# Patient Record
Sex: MALE | Race: WHITE | NOT HISPANIC OR LATINO | Employment: FULL TIME | URBAN - METROPOLITAN AREA
[De-identification: names, ages, dates, MRNs, and addresses within clinical notes are randomized per-mention and may not be internally consistent; named-entity substitution may affect disease eponyms.]

---

## 2017-02-21 ENCOUNTER — HOSPITAL ENCOUNTER (OUTPATIENT)
Dept: GENERAL RADIOLOGY | Facility: HOSPITAL | Age: 32
Discharge: HOME | End: 2017-02-21
Admitting: NURSE PRACTITIONER

## 2017-02-21 ENCOUNTER — OFFICE VISIT (OUTPATIENT)
Dept: FAMILY MEDICINE CLINIC | Facility: CLINIC | Age: 32
End: 2017-02-21

## 2017-02-21 VITALS
WEIGHT: 192 LBS | OXYGEN SATURATION: 100 % | HEIGHT: 75 IN | HEART RATE: 80 BPM | BODY MASS INDEX: 23.87 KG/M2 | TEMPERATURE: 98.4 F | DIASTOLIC BLOOD PRESSURE: 70 MMHG | SYSTOLIC BLOOD PRESSURE: 128 MMHG

## 2017-02-21 DIAGNOSIS — S39.012A LOW BACK STRAIN, INITIAL ENCOUNTER: ICD-10-CM

## 2017-02-21 DIAGNOSIS — J06.9 ACUTE URI: Primary | ICD-10-CM

## 2017-02-21 PROCEDURE — 99214 OFFICE O/P EST MOD 30 MIN: CPT | Performed by: NURSE PRACTITIONER

## 2017-02-21 PROCEDURE — 72100 X-RAY EXAM L-S SPINE 2/3 VWS: CPT

## 2017-02-21 RX ORDER — NAPROXEN 500 MG/1
500 TABLET ORAL 2 TIMES DAILY WITH MEALS
Qty: 60 TABLET | Refills: 1 | Status: SHIPPED | OUTPATIENT
Start: 2017-02-21 | End: 2017-08-31 | Stop reason: HOSPADM

## 2017-02-21 NOTE — PATIENT INSTRUCTIONS
Muscle Strain  A muscle strain is an injury that occurs when a muscle is stretched beyond its normal length. Usually a small number of muscle fibers are torn when this happens. Muscle strain is rated in degrees. First-degree strains have the least amount of muscle fiber tearing and pain. Second-degree and third-degree strains have increasingly more tearing and pain.   Usually, recovery from muscle strain takes 1-2 weeks. Complete healing takes 5-6 weeks.   CAUSES   Muscle strain happens when a sudden, violent force placed on a muscle stretches it too far. This may occur with lifting, sports, or a fall.   RISK FACTORS  Muscle strain is especially common in athletes.   SIGNS AND SYMPTOMS  At the site of the muscle strain, there may be:  · Pain.  · Bruising.  · Swelling.  · Difficulty using the muscle due to pain or lack of normal function.  DIAGNOSIS   Your health care provider will perform a physical exam and ask about your medical history.  TREATMENT   Often, the best treatment for a muscle strain is resting, icing, and applying cold compresses to the injured area.    HOME CARE INSTRUCTIONS   · Use the PRICE method of treatment to promote muscle healing during the first 2-3 days after your injury. The PRICE method involves:    Protecting the muscle from being injured again.    Restricting your activity and resting the injured body part.    Icing your injury. To do this, put ice in a plastic bag. Place a towel between your skin and the bag. Then, apply the ice and leave it on from 15-20 minutes each hour. After the third day, switch to moist heat packs.    Apply compression to the injured area with a splint or elastic bandage. Be careful not to wrap it too tightly. This may interfere with blood circulation or increase swelling.    Elevate the injured body part above the level of your heart as often as you can.  · Only take over-the-counter or prescription medicines for pain, discomfort, or fever as directed by your  health care provider.  · Warming up prior to exercise helps to prevent future muscle strains.  SEEK MEDICAL CARE IF:   · You have increasing pain or swelling in the injured area.  · You have numbness, tingling, or a significant loss of strength in the injured area.  MAKE SURE YOU:   · Understand these instructions.  · Will watch your condition.  · Will get help right away if you are not doing well or get worse.     This information is not intended to replace advice given to you by your health care provider. Make sure you discuss any questions you have with your health care provider.     Document Released: 12/18/2006 Document Revised: 10/08/2014 Document Reviewed: 07/17/2014  [employer] Interactive Patient Education ©2016 [employer]    Low Back Strain With Rehab  A strain is an injury in which a tendon or muscle is torn. The muscles and tendons of the lower back are vulnerable to strains. However, these muscles and tendons are very strong and require a great force to be injured. Strains are classified into three categories. Grade 1 strains cause pain, but the tendon is not lengthened. Grade 2 strains include a lengthened ligament, due to the ligament being stretched or partially ruptured. With grade 2 strains there is still function, although the function may be decreased. Grade 3 strains involve a complete tear of the tendon or muscle, and function is usually impaired.  SYMPTOMS   · Pain in the lower back.  · Pain that affects one side more than the other.  · Pain that gets worse with movement and may be felt in the hip, buttocks, or back of the thigh.  · Muscle spasms of the muscles in the back.  · Swelling along the muscles of the back.  · Loss of strength of the back muscles.  · Crackling sound (crepitation) when the muscles are touched.  CAUSES   Lower back strains occur when a force is placed on the muscles or tendons that is greater than they can handle. Common causes of injury include:  · Prolonged overuse  of the muscle-tendon units in the lower back, usually from incorrect posture.  · A single violent injury or force applied to the back.  RISK INCREASES WITH:  · Sports that involve twisting forces on the spine or a lot of bending at the waist (football, rugby, weightlifting, bowling, golf, tennis, speed skating, racquetball, swimming, running, gymnastics, diving).  · Poor strength and flexibility.  · Failure to warm up properly before activity.  · Family history of lower back pain or disk disorders.  · Previous back injury or surgery (especially fusion).  · Poor posture with lifting, especially heavy objects.  · Prolonged sitting, especially with poor posture.  PREVENTION   · Learn and use proper posture when sitting or lifting (maintain proper posture when sitting, lift using the knees and legs, not at the waist).  · Warm up and stretch properly before activity.  · Allow for adequate recovery between workouts.  · Maintain physical fitness:    Strength, flexibility, and endurance.    Cardiovascular fitness.  PROGNOSIS   If treated properly, lower back strains usually heal within 6 weeks.  RELATED COMPLICATIONS   · Recurring symptoms, resulting in a chronic problem.  · Chronic inflammation, scarring, and partial muscle-tendon tear.  · Delayed healing or resolution of symptoms.  · Prolonged disability.  TREATMENT   Treatment first involves the use of ice and medicine, to reduce pain and inflammation. The use of strengthening and stretching exercises may help reduce pain with activity. These exercises may be performed at home or with a therapist. Severe injuries may require referral to a therapist for further evaluation and treatment, such as ultrasound. Your caregiver may advise that you wear a back brace or corset, to help reduce pain and discomfort. Often, prolonged bed rest results in greater harm then benefit. Corticosteroid injections may be recommended. However, these should be reserved for the most serious  cases. It is important to avoid using your back when lifting objects. At night, sleep on your back on a firm mattress with a pillow placed under your knees. If non-surgical treatment is unsuccessful, surgery may be needed.   MEDICATION   · If pain medicine is needed, nonsteroidal anti-inflammatory medicines (aspirin and ibuprofen), or other minor pain relievers (acetaminophen), are often advised.  · Do not take pain medicine for 7 days before surgery.  · Prescription pain relievers may be given, if your caregiver thinks they are needed. Use only as directed and only as much as you need.  · Ointments applied to the skin may be helpful.  · Corticosteroid injections may be given by your caregiver. These injections should be reserved for the most serious cases, because they may only be given a certain number of times.  HEAT AND COLD  · Cold treatment (icing) should be applied for 10 to 15 minutes every 2 to 3 hours for inflammation and pain, and immediately after activity that aggravates your symptoms. Use ice packs or an ice massage.  · Heat treatment may be used before performing stretching and strengthening activities prescribed by your caregiver, physical therapist, or . Use a heat pack or a warm water soak.  SEEK MEDICAL CARE IF:   · Symptoms get worse or do not improve in 2 to 4 weeks, despite treatment.  · You develop numbness, weakness, or loss of bowel or bladder function.  · New, unexplained symptoms develop. (Drugs used in treatment may produce side effects.)  EXERCISES   RANGE OF MOTION (ROM) AND STRETCHING EXERCISES - Low Back Strain  Most people with lower back pain will find that their symptoms get worse with excessive bending forward (flexion) or arching at the lower back (extension). The exercises which will help resolve your symptoms will focus on the opposite motion.   Your physician, physical therapist or  will help you determine which exercises will be most helpful to  resolve your lower back pain. Do not complete any exercises without first consulting with your caregiver. Discontinue any exercises which make your symptoms worse until you speak to your caregiver.   If you have pain, numbness or tingling which travels down into your buttocks, leg or foot, the goal of the therapy is for these symptoms to move closer to your back and eventually resolve. Sometimes, these leg symptoms will get better, but your lower back pain may worsen. This is typically an indication of progress in your rehabilitation. Be very alert to any changes in your symptoms and the activities in which you participated in the 24 hours prior to the change. Sharing this information with your caregiver will allow him/her to most efficiently treat your condition.  · These exercises may help you when beginning to rehabilitate your injury. Your symptoms may resolve with or without further involvement from your physician, physical therapist or . While completing these exercises, remember:  · Restoring tissue flexibility helps normal motion to return to the joints. This allows healthier, less painful movement and activity.  · An effective stretch should be held for at least 30 seconds.  · A stretch should never be painful. You should only feel a gentle lengthening or release in the stretched tissue.  FLEXION RANGE OF MOTION AND STRETCHING EXERCISES:  STRETCH - Flexion, Single Knee to Chest   · Lie on a firm bed or floor with both legs extended in front of you.  · Keeping one leg in contact with the floor, bring your opposite knee to your chest. Hold your leg in place by either grabbing behind your thigh or at your knee.  · Pull until you feel a gentle stretch in your lower back. Hold __________ seconds.  · Slowly release your grasp and repeat the exercise with the opposite side.  Repeat __________ times. Complete this exercise __________ times per day.   STRETCH - Flexion, Double Knee to Chest   · Lie  on a firm bed or floor with both legs extended in front of you.  · Keeping one leg in contact with the floor, bring your opposite knee to your chest.  · Tense your stomach muscles to support your back and then lift your other knee to your chest. Hold your legs in place by either grabbing behind your thighs or at your knees.  · Pull both knees toward your chest until you feel a gentle stretch in your lower back. Hold __________ seconds.  · Tense your stomach muscles and slowly return one leg at a time to the floor.  Repeat __________ times. Complete this exercise __________ times per day.   STRETCH - Low Trunk Rotation  · Lie on a firm bed or floor. Keeping your legs in front of you, bend your knees so they are both pointed toward the ceiling and your feet are flat on the floor.  · Extend your arms out to the side. This will stabilize your upper body by keeping your shoulders in contact with the floor.  · Gently and slowly drop both knees together to one side until you feel a gentle stretch in your lower back. Hold for __________ seconds.  · Tense your stomach muscles to support your lower back as you bring your knees back to the starting position. Repeat the exercise to the other side.  Repeat __________ times. Complete this exercise __________ times per day   EXTENSION RANGE OF MOTION AND FLEXIBILITY EXERCISES:  STRETCH - Extension, Prone on Elbows   · Lie on your stomach on the floor, a bed will be too soft. Place your palms about shoulder width apart and at the height of your head.  · Place your elbows under your shoulders. If this is too painful, stack pillows under your chest.  · Allow your body to relax so that your hips drop lower and make contact more completely with the floor.  · Hold this position for __________ seconds.  · Slowly return to lying flat on the floor.  Repeat __________ times. Complete this exercise __________ times per day.   RANGE OF MOTION - Extension, Prone Press Ups  · Lie on your  "stomach on the floor, a bed will be too soft. Place your palms about shoulder width apart and at the height of your head.  · Keeping your back as relaxed as possible, slowly straighten your elbows while keeping your hips on the floor. You may adjust the placement of your hands to maximize your comfort. As you gain motion, your hands will come more underneath your shoulders.  · Hold this position __________ seconds.  · Slowly return to lying flat on the floor.  Repeat __________ times. Complete this exercise __________ times per day.   RANGE OF MOTION- Quadruped, Neutral Spine   · Assume a hands and knees position on a firm surface. Keep your hands under your shoulders and your knees under your hips. You may place padding under your knees for comfort.  · Drop your head and point your tail bone toward the ground below you. This will round out your lower back like an angry cat. Hold this position for __________ seconds.  · Slowly lift your head and release your tail bone so that your back sags into a large arch, like an old horse.  · Hold this position for __________ seconds.  · Repeat this until you feel limber in your lower back.  · Now, find your \"sweet spot.\" This will be the most comfortable position somewhere between the two previous positions. This is your neutral spine. Once you have found this position, tense your stomach muscles to support your lower back.  · Hold this position for __________ seconds.  Repeat __________ times. Complete this exercise __________ times per day.   STRENGTHENING EXERCISES - Low Back Strain  These exercises may help you when beginning to rehabilitate your injury. These exercises should be done near your \"sweet spot.\" This is the neutral, low-back arch, somewhere between fully rounded and fully arched, that is your least painful position. When performed in this safe range of motion, these exercises can be used for people who have either a flexion or extension based injury. These " exercises may resolve your symptoms with or without further involvement from your physician, physical therapist or . While completing these exercises, remember:   · Muscles can gain both the endurance and the strength needed for everyday activities through controlled exercises.  · Complete these exercises as instructed by your physician, physical therapist or . Increase the resistance and repetitions only as guided.  · You may experience muscle soreness or fatigue, but the pain or discomfort you are trying to eliminate should never worsen during these exercises. If this pain does worsen, stop and make certain you are following the directions exactly. If the pain is still present after adjustments, discontinue the exercise until you can discuss the trouble with your caregiver.  STRENGTHENING - Deep Abdominals, Pelvic Tilt  · Lie on a firm bed or floor. Keeping your legs in front of you, bend your knees so they are both pointed toward the ceiling and your feet are flat on the floor.  · Tense your lower abdominal muscles to press your lower back into the floor. This motion will rotate your pelvis so that your tail bone is scooping upwards rather than pointing at your feet or into the floor.  · With a gentle tension and even breathing, hold this position for __________ seconds.  Repeat __________ times. Complete this exercise __________ times per day.   STRENGTHENING - Abdominals, Crunches   · Lie on a firm bed or floor. Keeping your legs in front of you, bend your knees so they are both pointed toward the ceiling and your feet are flat on the floor. Cross your arms over your chest.  · Slightly tip your chin down without bending your neck.  · Tense your abdominals and slowly lift your trunk high enough to just clear your shoulder blades. Lifting higher can put excessive stress on the lower back and does not further strengthen your abdominal muscles.  · Control your return to the starting  position.  Repeat __________ times. Complete this exercise __________ times per day.   STRENGTHENING - Quadruped, Opposite UE/LE Lift   · Assume a hands and knees position on a firm surface. Keep your hands under your shoulders and your knees under your hips. You may place padding under your knees for comfort.  · Find your neutral spine and gently tense your abdominal muscles so that you can maintain this position. Your shoulders and hips should form a rectangle that is parallel with the floor and is not twisted.  · Keeping your trunk steady, lift your right hand no higher than your shoulder and then your left leg no higher than your hip. Make sure you are not holding your breath. Hold this position __________ seconds.  · Continuing to keep your abdominal muscles tense and your back steady, slowly return to your starting position. Repeat with the opposite arm and leg.  Repeat __________ times. Complete this exercise __________ times per day.   STRENGTHENING - Lower Abdominals, Double Knee Lift  · Lie on a firm bed or floor. Keeping your legs in front of you, bend your knees so they are both pointed toward the ceiling and your feet are flat on the floor.  · Tense your abdominal muscles to brace your lower back and slowly lift both of your knees until they come over your hips. Be certain not to hold your breath.  · Hold __________ seconds. Using your abdominal muscles, return to the starting position in a slow and controlled manner.  Repeat __________ times. Complete this exercise __________ times per day.   POSTURE AND BODY MECHANICS CONSIDERATIONS - Low Back Strain  Keeping correct posture when sitting, standing or completing your activities will reduce the stress put on different body tissues, allowing injured tissues a chance to heal and limiting painful experiences. The following are general guidelines for improved posture. Your physician or physical therapist will provide you with any instructions specific to  your needs. While reading these guidelines, remember:  · The exercises prescribed by your provider will help you have the flexibility and strength to maintain correct postures.  · The correct posture provides the best environment for your joints to work. All of your joints have less wear and tear when properly supported by a spine with good posture. This means you will experience a healthier, less painful body.  · Correct posture must be practiced with all of your activities, especially prolonged sitting and standing. Correct posture is as important when doing repetitive low-stress activities (typing) as it is when doing a single heavy-load activity (lifting).  RESTING POSITIONS  Consider which positions are most painful for you when choosing a resting position. If you have pain with flexion-based activities (sitting, bending, stooping, squatting), choose a position that allows you to rest in a less flexed posture. You would want to avoid curling into a fetal position on your side. If your pain worsens with extension-based activities (prolonged standing, working overhead), avoid resting in an extended position such as sleeping on your stomach. Most people will find more comfort when they rest with their spine in a more neutral position, neither too rounded nor too arched. Lying on a non-sagging bed on your side with a pillow between your knees, or on your back with a pillow under your knees will often provide some relief. Keep in mind, being in any one position for a prolonged period of time, no matter how correct your posture, can still lead to stiffness.  PROPER SITTING POSTURE  In order to minimize stress and discomfort on your spine, you must sit with correct posture. Sitting with good posture should be effortless for a healthy body. Returning to good posture is a gradual process. Many people can work toward this most comfortably by using various supports until they have the flexibility and strength to maintain  this posture on their own.  When sitting with proper posture, your ears will fall over your shoulders and your shoulders will fall over your hips. You should use the back of the chair to support your upper back. Your lower back will be in a neutral position, just slightly arched. You may place a small pillow or folded towel at the base of your lower back for support.   When working at a desk, create an environment that supports good, upright posture. Without extra support, muscles tire, which leads to excessive strain on joints and other tissues. Keep these recommendations in mind:  CHAIR:  · A chair should be able to slide under your desk when your back makes contact with the back of the chair. This allows you to work closely.  · The chair's height should allow your eyes to be level with the upper part of your monitor and your hands to be slightly lower than your elbows.  BODY POSITION  · Your feet should make contact with the floor. If this is not possible, use a foot rest.  · Keep your ears over your shoulders. This will reduce stress on your neck and lower back.  INCORRECT SITTING POSTURES   If you are feeling tired and unable to assume a healthy sitting posture, do not slouch or slump. This puts excessive strain on your back tissues, causing more damage and pain. Healthier options include:  · Using more support, like a lumbar pillow.  · Switching tasks to something that requires you to be upright or walking.  · Talking a brief walk.  · Lying down to rest in a neutral-spine position.  PROLONGED STANDING WHILE SLIGHTLY LEANING FORWARD   When completing a task that requires you to lean forward while standing in one place for a long time, place either foot up on a stationary 2-4 inch high object to help maintain the best posture. When both feet are on the ground, the lower back tends to lose its slight inward curve. If this curve flattens (or becomes too large), then the back and your other joints will experience  too much stress, tire more quickly, and can cause pain.  CORRECT STANDING POSTURES  Proper standing posture should be assumed with all daily activities, even if they only take a few moments, like when brushing your teeth. As in sitting, your ears should fall over your shoulders and your shoulders should fall over your hips. You should keep a slight tension in your abdominal muscles to brace your spine. Your tailbone should point down to the ground, not behind your body, resulting in an over-extended swayback posture.   INCORRECT STANDING POSTURES   Common incorrect standing postures include a forward head, locked knees and/or an excessive swayback.  WALKING  Walk with an upright posture. Your ears, shoulders and hips should all line-up.  PROLONGED ACTIVITY IN A FLEXED POSITION  When completing a task that requires you to bend forward at your waist or lean over a low surface, try to find a way to stabilize 3 out of 4 of your limbs. You can place a hand or elbow on your thigh or rest a knee on the surface you are reaching across. This will provide you more stability so that your muscles do not fatigue as quickly. By keeping your knees relaxed, or slightly bent, you will also reduce stress across your lower back.  CORRECT LIFTING TECHNIQUES  DO :   · Assume a wide stance. This will provide you more stability and the opportunity to get as close as possible to the object which you are lifting.  · Tense your abdominals to brace your spine. Bend at the knees and hips. Keeping your back locked in a neutral-spine position, lift using your leg muscles. Lift with your legs, keeping your back straight.  · Test the weight of unknown objects before attempting to lift them.  · Try to keep your elbows locked down at your sides in order get the best strength from your shoulders when carrying an object.  · Always ask for help when lifting heavy or awkward objects.  INCORRECT LIFTING TECHNIQUES  DO NOT:   · Lock your knees when  lifting, even if it is a small object.  · Bend and twist. Pivot at your feet or move your feet when needing to change directions.  · Assume that you can safely  even a paper clip without proper posture.     This information is not intended to replace advice given to you by your health care provider. Make sure you discuss any questions you have with your health care provider.     Document Released: 12/18/2006 Document Revised: 01/08/2016 Document Reviewed: 09/28/2016  [employer] Interactive Patient Education ©2016 [employer]      Discharge instructions    Start naproxen twice daily for anti-inflammatory effects  Start exercise program 5 days a week stretching back  Good ergonomics at work  Consider standing desk standing half the day rotating    If weakness incontinence groin paresthesias emergency room    Recheck 6 weeks     Thank You,      James Epley, NP

## 2017-02-22 NOTE — PROGRESS NOTES
Subjective   Paul West is a 31 y.o. male.     HPI Comments: Cold symptoms sinus drainage scratchy throat mildly sore tender nodes no cough no fever no chills no body aches  Symptoms 4 days no recent travel    Back strain approximately 6 weeks ago his girlfriend jumped in his arms  Call her felt instant pain  Continues to have low back pain no worsening  Mid low back      No prior chronic back problems        Back Pain   This is a new problem. The current episode started more than 1 month ago. The problem occurs daily. The problem is unchanged. The pain is present in the lumbar spine. The quality of the pain is described as aching. The pain does not radiate. The pain is mild. The pain is worse during the day. The symptoms are aggravated by lying down and bending. Pertinent negatives include no bladder incontinence, bowel incontinence, leg pain, numbness, paresis, paresthesias, tingling, weakness or weight loss. He has tried nothing for the symptoms.        The following portions of the patient's history were reviewed and updated as appropriate: allergies, current medications, past medical history, past social history, past surgical history and problem list.    Review of Systems   Constitutional: Negative for weight loss.   HENT: Positive for rhinorrhea, sneezing and sore throat.    Respiratory: Negative for shortness of breath.    Gastrointestinal: Negative for bowel incontinence.   Genitourinary: Negative for bladder incontinence.   Musculoskeletal: Positive for back pain.   Neurological: Negative for tingling, weakness, numbness and paresthesias.   All other systems reviewed and are negative.      Objective   Physical Exam   Constitutional: He is oriented to person, place, and time. He appears well-developed and well-nourished.   HENT:   Head: Normocephalic.   Mouth/Throat: Oropharynx is clear and moist.   Tm clear kobe no mass canal patent without d/c turbinates congested   Eyes: Conjunctivae are normal.  Pupils are equal, round, and reactive to light. No scleral icterus.   Neck: Neck supple. No JVD present. No thyromegaly present.   Cardiovascular: Normal rate, regular rhythm and normal heart sounds.  Exam reveals no gallop and no friction rub.    No murmur heard.  Pulmonary/Chest: Effort normal and breath sounds normal. No stridor. No respiratory distress. He has no wheezes. He has no rales.   Abdominal: Soft. Bowel sounds are normal. He exhibits no distension. There is no tenderness.   No hepatosplenomegaly, no ascites,   Musculoskeletal: He exhibits no edema or tenderness.   Negative straight leg raise normal flexion plantar flexion dorsal flexion normal   Lymphadenopathy:     He has no cervical adenopathy.   Neurological: He is alert and oriented to person, place, and time. He has normal reflexes.   Skin: Skin is warm and dry. No rash noted. No erythema.   Psychiatric: He has a normal mood and affect. His behavior is normal. Judgment and thought content normal.   Vitals reviewed.      Assessment/Plan   Paul was seen today for back pain and uri.    Diagnoses and all orders for this visit:    Acute URI    Low back strain, initial encounter  -     XR Spine Lumbar 2 or 3 View  -     naproxen (NAPROSYN) 500 MG tablet; Take 1 tablet by mouth 2 (Two) Times a Day With Meals. For low back pain, strain                  Discharge instructions    Start naproxen twice daily for anti-inflammatory effects  Start exercise program 5 days a week stretching back  Good ergonomics at work  Consider standing desk standing half the day rotating    If weakness incontinence groin paresthesias emergency room    Recheck 6 weeks     Thank You,      James Epley, NP

## 2017-02-28 ENCOUNTER — TELEPHONE (OUTPATIENT)
Dept: FAMILY MEDICINE CLINIC | Facility: CLINIC | Age: 32
End: 2017-02-28

## 2017-06-01 ENCOUNTER — OFFICE VISIT (OUTPATIENT)
Dept: FAMILY MEDICINE CLINIC | Facility: CLINIC | Age: 32
End: 2017-06-01

## 2017-06-01 VITALS
BODY MASS INDEX: 23.62 KG/M2 | HEART RATE: 75 BPM | OXYGEN SATURATION: 98 % | TEMPERATURE: 98.1 F | SYSTOLIC BLOOD PRESSURE: 114 MMHG | WEIGHT: 190 LBS | DIASTOLIC BLOOD PRESSURE: 74 MMHG | HEIGHT: 75 IN

## 2017-06-01 DIAGNOSIS — R10.9 ABDOMINAL CRAMPING: ICD-10-CM

## 2017-06-01 DIAGNOSIS — R10.9 ABDOMINAL PAIN, UNSPECIFIED LOCATION: ICD-10-CM

## 2017-06-01 DIAGNOSIS — M54.50 ACUTE MIDLINE LOW BACK PAIN WITHOUT SCIATICA: ICD-10-CM

## 2017-06-01 DIAGNOSIS — S39.92XD LOWER BACK INJURY, SUBSEQUENT ENCOUNTER: Primary | ICD-10-CM

## 2017-06-01 LAB
ALBUMIN SERPL-MCNC: 4.8 G/DL (ref 3.5–5.2)
ALBUMIN/GLOB SERPL: 1.5 G/DL
ALP SERPL-CCNC: 60 U/L (ref 39–117)
ALT SERPL-CCNC: 30 U/L (ref 1–41)
AST SERPL-CCNC: 26 U/L (ref 1–40)
BASOPHILS # BLD AUTO: 0.04 10*3/MM3 (ref 0–0.2)
BASOPHILS NFR BLD AUTO: 0.8 % (ref 0–1.5)
BILIRUB SERPL-MCNC: 0.8 MG/DL (ref 0.1–1.2)
BUN SERPL-MCNC: 14 MG/DL (ref 6–20)
BUN/CREAT SERPL: 14.1 (ref 7–25)
CALCIUM SERPL-MCNC: 10.5 MG/DL (ref 8.6–10.5)
CHLORIDE SERPL-SCNC: 99 MMOL/L (ref 98–107)
CO2 SERPL-SCNC: 26.4 MMOL/L (ref 22–29)
CREAT SERPL-MCNC: 0.99 MG/DL (ref 0.76–1.27)
EOSINOPHIL # BLD AUTO: 0.13 10*3/MM3 (ref 0–0.7)
EOSINOPHIL NFR BLD AUTO: 2.7 % (ref 0.3–6.2)
ERYTHROCYTE [DISTWIDTH] IN BLOOD BY AUTOMATED COUNT: 13.7 % (ref 11.5–14.5)
GLOBULIN SER CALC-MCNC: 3.1 GM/DL
GLUCOSE SERPL-MCNC: 97 MG/DL (ref 65–99)
HCT VFR BLD AUTO: 46 % (ref 40.4–52.2)
HGB BLD-MCNC: 15.1 G/DL (ref 13.7–17.6)
IMM GRANULOCYTES # BLD: 0 10*3/MM3 (ref 0–0.03)
IMM GRANULOCYTES NFR BLD: 0 % (ref 0–0.5)
LIPASE SERPL-CCNC: 29 U/L (ref 13–60)
LYMPHOCYTES # BLD AUTO: 1.61 10*3/MM3 (ref 0.9–4.8)
LYMPHOCYTES NFR BLD AUTO: 34 % (ref 19.6–45.3)
MCH RBC QN AUTO: 27.8 PG (ref 27–32.7)
MCHC RBC AUTO-ENTMCNC: 32.8 G/DL (ref 32.6–36.4)
MCV RBC AUTO: 84.6 FL (ref 79.8–96.2)
MONOCYTES # BLD AUTO: 0.62 10*3/MM3 (ref 0.2–1.2)
MONOCYTES NFR BLD AUTO: 13.1 % (ref 5–12)
NEUTROPHILS # BLD AUTO: 2.34 10*3/MM3 (ref 1.9–8.1)
NEUTROPHILS NFR BLD AUTO: 49.4 % (ref 42.7–76)
PLATELET # BLD AUTO: 196 10*3/MM3 (ref 140–500)
POTASSIUM SERPL-SCNC: 4.5 MMOL/L (ref 3.5–5.2)
PROT SERPL-MCNC: 7.9 G/DL (ref 6–8.5)
RBC # BLD AUTO: 5.44 10*6/MM3 (ref 4.6–6)
SODIUM SERPL-SCNC: 140 MMOL/L (ref 136–145)
TSH SERPL DL<=0.005 MIU/L-ACNC: 1.73 MIU/ML (ref 0.27–4.2)
WBC # BLD AUTO: 4.74 10*3/MM3 (ref 4.5–10.7)

## 2017-06-01 PROCEDURE — 99214 OFFICE O/P EST MOD 30 MIN: CPT | Performed by: NURSE PRACTITIONER

## 2017-06-01 RX ORDER — OMEPRAZOLE 40 MG/1
40 CAPSULE, DELAYED RELEASE ORAL DAILY
Qty: 30 CAPSULE | Refills: 1 | Status: SHIPPED | OUTPATIENT
Start: 2017-06-01 | End: 2017-09-07 | Stop reason: SDUPTHER

## 2017-06-01 NOTE — PATIENT INSTRUCTIONS
Discharge instructions  Avoid anti-inflammatory's now  If pain medicine required take Tylenol extra strength 8 hour extended release 650 mg 1-2 twice daily  Avoid alcohol with this    Physical therapy  MRI  Follow-up in 6 weeks or after physical therapy    Dietary changes avoid spacey greasy foods gassy     If severe pain vomiting fever  Or persistent pain emergency room    Thank You,      James Epley,  NP

## 2017-06-01 NOTE — PROGRESS NOTES
Patient's here follow-up low back pain  Injury early January after girlfriend jumped in his arms felt immediate sharp low back pain    Continues to have pain no radiation x-rays normal  Anti-inflammatory several weeks without help  Better if he lays down flex his knees  Aggravated by poor posture  Sitting    No weakness no bowel bladder change related to back       Several weeks of an anti-inflammatory at least 3  As well as regular home exercises which I instructed him last visit  Modest improvement  Still continues to have aggravating pain  Pain level 3-5  Rarely 0 usually as above      Mid and upper abdominal cramping after eating volume,      Plan  Physical therapy  Resume anti-inflammatory's temporarily  MRI rule out disc herniation lumbar spine    Follow-up after therapy  If weakness bowel incontinence fever chills emergency room

## 2017-06-08 ENCOUNTER — TELEPHONE (OUTPATIENT)
Dept: FAMILY MEDICINE CLINIC | Facility: CLINIC | Age: 32
End: 2017-06-08

## 2017-06-08 DIAGNOSIS — M54.50 ACUTE MIDLINE LOW BACK PAIN WITHOUT SCIATICA: Primary | ICD-10-CM

## 2017-06-08 DIAGNOSIS — M51.36 BULGING LUMBAR DISC: ICD-10-CM

## 2017-06-09 ENCOUNTER — TELEPHONE (OUTPATIENT)
Dept: FAMILY MEDICINE CLINIC | Facility: CLINIC | Age: 32
End: 2017-06-09

## 2017-06-09 NOTE — TELEPHONE ENCOUNTER
patient called this morning to discuss MRI results. patient will be available  today. if for some reason he does not answer you may leave results on his voicemail.

## 2017-06-11 PROBLEM — R10.9 ABDOMINAL PAIN: Status: ACTIVE | Noted: 2017-06-11

## 2017-06-11 PROBLEM — S39.92XA LOWER BACK INJURY: Status: ACTIVE | Noted: 2017-06-11

## 2017-06-11 PROBLEM — R10.9 ABDOMINAL CRAMPING: Status: ACTIVE | Noted: 2017-06-11

## 2017-06-11 PROBLEM — M54.50 ACUTE MIDLINE LOW BACK PAIN WITHOUT SCIATICA: Status: ACTIVE | Noted: 2017-06-11

## 2017-06-11 NOTE — PROGRESS NOTES
Subjective   Paul West is a 31 y.o. male.     HPI Comments:    started after picking up girlfriend several months ago     wn flex his knees  Aggravated by poor posture  Sitting    No weakness no bowel bladder change related to back       Several weeks of an anti-inflammatory at least 3  As well as regular home exercises which I instructed him last visit  Modest improvement  Still continues to have aggravating pain  Pain level 3-5  Rarely 0 usually as above      Mid and upper abdominal cramping after eating   Several months  Not related to back pain no radiation no nausea vomiting fever chills no coffee-ground emesis              Back Pain   This is a new problem. The current episode started more than 1 month ago. The problem occurs daily. The problem is unchanged. The pain is present in the lumbar spine. The pain is moderate. The symptoms are aggravated by bending and position. Associated symptoms include numbness. Pertinent negatives include no fever. The treatment provided mild relief.        The following portions of the patient's history were reviewed and updated as appropriate: allergies, past family history, past medical history, past social history, past surgical history and problem list.    Review of Systems   Constitutional: Positive for activity change. Negative for appetite change, fatigue, fever and unexpected weight change.   HENT: Negative.    Eyes: Negative.    Respiratory: Negative.    Gastrointestinal: Negative for abdominal distention, anal bleeding, blood in stool, diarrhea and vomiting.        Mild cramping after meals   Genitourinary: Negative.    Musculoskeletal: Positive for back pain.   Skin: Negative.    Neurological: Positive for numbness.   Psychiatric/Behavioral: Negative.        Objective   Physical Exam   Constitutional: He is oriented to person, place, and time. He appears well-developed and well-nourished.   HENT:   Head: Normocephalic.   Nose: Nose normal.   Mouth/Throat:  Oropharynx is clear and moist.   Tm clear kobe no mass canal patent without d/c   Eyes: Conjunctivae are normal. Pupils are equal, round, and reactive to light. No scleral icterus.   Neck: Neck supple. No JVD present. No thyromegaly present.   Cardiovascular: Normal rate, regular rhythm and normal heart sounds.  Exam reveals no gallop and no friction rub.    No murmur heard.  Pulmonary/Chest: Effort normal and breath sounds normal. No stridor. No respiratory distress. He has no wheezes. He has no rales.   Abdominal: Soft. Bowel sounds are normal. He exhibits no distension. There is no tenderness.   No hepatosplenomegaly, no ascites,   Musculoskeletal: He exhibits no edema or tenderness.   Negative straight leg raise plantar flexion dorsal flexion normal gait normal  Low back spine nontender   Lymphadenopathy:     He has no cervical adenopathy.   Neurological: He is alert and oriented to person, place, and time. He has normal reflexes.   Skin: Skin is warm and dry. No rash noted. No erythema.   Psychiatric: He has a normal mood and affect. His behavior is normal. Judgment and thought content normal.   Vitals reviewed.      Assessment/Plan   Paul was seen today for abdominal pain and back pain.    Diagnoses and all orders for this visit:    Lower back injury, subsequent encounter  -     MRI Lumbar Spine Without Contrast  -     Ambulatory Referral to Physical Therapy Evaluate and treat  -     CBC & Differential  -     Comprehensive Metabolic Panel  -     TSH Rfx On Abnormal To Free T4  -     Lipase  -     H. Pylori Antigen, Stool    Acute midline low back pain without sciatica  -     MRI Lumbar Spine Without Contrast  -     Ambulatory Referral to Physical Therapy Evaluate and treat  -     CBC & Differential  -     Comprehensive Metabolic Panel  -     TSH Rfx On Abnormal To Free T4  -     Lipase  -     H. Pylori Antigen, Stool    Abdominal pain, unspecified location  -     CBC & Differential  -     Comprehensive  Metabolic Panel  -     TSH Rfx On Abnormal To Free T4  -     Lipase  -     H. Pylori Antigen, Stool    Abdominal cramping  -     CBC & Differential  -     Comprehensive Metabolic Panel  -     TSH Rfx On Abnormal To Free T4  -     Lipase  -     H. Pylori Antigen, Stool    Other orders  -     omeprazole (priLOSEC) 40 MG capsule; Take 1 capsule by mouth Daily.                  Plan        Discharge instructions  Avoid anti-inflammatory's now  If pain medicine required take Tylenol extra strength 8 hour extended release 650 mg 1-2 twice daily  Avoid alcohol with this    Physical therapy  MRI  Follow-up in 6 weeks or after physical therapy    Dietary changes avoid spacey greasy foods gassy     If severe pain vomiting fever  Or persistent pain emergency room    Thank You,      James Epley, NP                                Physical therapy  Resume anti-inflammatory's temporarily  MRI rule out disc herniation lumbar spine    Follow-up after therapy  If weakness bowel incontinence fever chills emergency room         Boundary meals  If abdominal pain increased fever chills vomiting black tarry stools coffee-ground emesis emergency room  Gastroenterology if not resolved  Soon        Call for results of MRIf/u after PT

## 2017-06-12 PROBLEM — M51.369 BULGING LUMBAR DISC: Status: ACTIVE | Noted: 2017-06-12

## 2017-06-12 PROBLEM — M51.36 BULGING LUMBAR DISC: Status: ACTIVE | Noted: 2017-06-12

## 2017-06-12 NOTE — TELEPHONE ENCOUNTER
Discussed with patient his MRI he does have lumbar disc bulging L4 to L5 L5-S1 likely to explain his pain  Refer to neurosurgery further evaluation  If weakness fever right paresthesias or bladder incontinence or retention  Emergency room

## 2017-08-02 ENCOUNTER — TELEPHONE (OUTPATIENT)
Dept: FAMILY MEDICINE CLINIC | Facility: CLINIC | Age: 32
End: 2017-08-02

## 2017-08-04 ENCOUNTER — TELEPHONE (OUTPATIENT)
Dept: NEUROSURGERY | Facility: CLINIC | Age: 32
End: 2017-08-04

## 2017-08-04 DIAGNOSIS — R10.9 ABDOMINAL PAIN, UNSPECIFIED LOCATION: Primary | ICD-10-CM

## 2017-08-04 NOTE — TELEPHONE ENCOUNTER
Called patient to check the status of their new patient packet, as we have not received it and we need it back 3 days prior to appointment or their appointment will be canceled. Had to leave a voicemail to call the office.

## 2017-08-10 ENCOUNTER — OFFICE VISIT (OUTPATIENT)
Dept: NEUROSURGERY | Facility: CLINIC | Age: 32
End: 2017-08-10

## 2017-08-10 VITALS
DIASTOLIC BLOOD PRESSURE: 83 MMHG | BODY MASS INDEX: 23.5 KG/M2 | HEIGHT: 75 IN | SYSTOLIC BLOOD PRESSURE: 133 MMHG | WEIGHT: 189 LBS | HEART RATE: 59 BPM

## 2017-08-10 DIAGNOSIS — M54.50 ACUTE MIDLINE LOW BACK PAIN WITHOUT SCIATICA: Primary | ICD-10-CM

## 2017-08-10 PROCEDURE — 99243 OFF/OP CNSLTJ NEW/EST LOW 30: CPT | Performed by: NEUROLOGICAL SURGERY

## 2017-08-10 NOTE — PROGRESS NOTES
Subjective   Patient ID: Paul West is a 31 y.o. male is being seen for consultation today at the request of James Epley, APRN for evaluation of back pain.  He presents with MRI from [facility].    Back Pain   Associated symptoms include abdominal pain. Pertinent negatives include no chest pain.     This patient has been having some pain in his lower back since last December.  At that time he was helping his fiancée up off the floor and lifted her.  Subsequent to that he had the pain in his back.  It is a little off to the side of midline but just in his back.  There is no radiation into his legs.  It is dull and aching in nature and at times is severe but most of the time is tolerable.  He has no difficulty with bowel or bladder control or other associated symptoms and no numbness and tingling in his perineum on his legs.  He has been treated with some anti-inflammatory medications which helped but he had to stop them due to stomach problems.    The following portions of the patient's history were reviewed and updated as appropriate: allergies, current medications, past family history, past medical history, past social history, past surgical history and problem list.    Review of Systems   Constitutional: Positive for appetite change.   Respiratory: Negative for shortness of breath.    Cardiovascular: Negative for chest pain.   Gastrointestinal: Positive for abdominal distention and abdominal pain.   Musculoskeletal: Positive for back pain.   Neurological: Positive for light-headedness.   All other systems reviewed and are negative.      Objective   Physical Exam   Constitutional: He is oriented to person, place, and time. He appears well-developed and well-nourished.   HENT:   Head: Normocephalic and atraumatic.   Eyes: Conjunctivae and EOM are normal. Pupils are equal, round, and reactive to light.   Fundoscopic exam:       The right eye shows no papilledema. The right eye shows venous pulsations.        The  left eye shows no papilledema. The left eye shows venous pulsations.   Neck: Carotid bruit is not present.   Neurological: He is oriented to person, place, and time. He has a normal Finger-Nose-Finger Test and a normal Heel to Shin Test. Gait normal.   Reflex Scores:       Tricep reflexes are 2+ on the right side and 2+ on the left side.       Bicep reflexes are 2+ on the right side and 2+ on the left side.       Brachioradialis reflexes are 2+ on the right side and 2+ on the left side.       Patellar reflexes are 2+ on the right side and 2+ on the left side.       Achilles reflexes are 2+ on the right side and 2+ on the left side.  Psychiatric: His speech is normal.     Neurologic Exam     Mental Status   Oriented to person, place, and time.   Registration of memory: Good recent and remote memory.   Attention: normal. Concentration: normal.   Speech: speech is normal   Level of consciousness: alert  Knowledge: consistent with education.     Cranial Nerves     CN II   Visual fields full to confrontation.   Visual acuity: normal    CN III, IV, VI   Pupils are equal, round, and reactive to light.  Extraocular motions are normal.     CN V   Facial sensation intact.   Right corneal reflex: normal  Left corneal reflex: normal    CN VII   Facial expression full, symmetric.   Right facial weakness: none  Left facial weakness: none    CN VIII   Hearing: intact    CN IX, X   Palate: symmetric    CN XI   Right sternocleidomastoid strength: normal  Left sternocleidomastoid strength: normal    CN XII   Tongue: not atrophic  Tongue deviation: none    Motor Exam   Muscle bulk: normal  Right arm tone: normal  Left arm tone: normal  Right leg tone: normal  Left leg tone: normal    Strength   Strength 5/5 except as noted.     Sensory Exam   Light touch normal.     Gait, Coordination, and Reflexes     Gait  Gait: normal    Coordination   Finger to nose coordination: normal  Heel to shin coordination: normal    Reflexes   Right  brachioradialis: 2+  Left brachioradialis: 2+  Right biceps: 2+  Left biceps: 2+  Right triceps: 2+  Left triceps: 2+  Right patellar: 2+  Left patellar: 2+  Right achilles: 2+  Left achilles: 2+  Right : 2+  Left : 2+      Assessment/Plan   Independent Review of Radiographic Studies:      I reviewed his MRI of the lumbar spine done on June 6 of this year at [facility] myself.  This shows some minimal midline disc bulging slightly eccentric to the left at L5-S1 but the neural foramina and the canal are open.  There is also some left-sided disc bulging at L4 5 but not severe.  L3 4 is fairly normal as is L2 3 and L1 to shows some minimal midline disc bulges and possibly some left-sided disc bulging but again no significant pressure on the nerves.    I also reviewed some plain films done at [facility] which look okay with no evidence of instability or significant degenerative disease.    Medical Decision Making:      I told the patient about the imaging.  I told him that from my point of view I don't see anything here that I would recommend any surgery for.  I did suggest that we try some physical therapy to try and keep this from flaring up in the future.  I also told him that if it does flare up in the future we can try some epidural blocks as well.  He will call if it does flare up.    Paul was seen today for back pain.    Diagnoses and all orders for this visit:    Acute midline low back pain without sciatica  -     Ambulatory Referral to Physical Therapy    Return if symptoms worsen or fail to improve.

## 2017-08-22 ENCOUNTER — OFFICE VISIT (OUTPATIENT)
Dept: GASTROENTEROLOGY | Facility: CLINIC | Age: 32
End: 2017-08-22

## 2017-08-22 VITALS
TEMPERATURE: 98.1 F | BODY MASS INDEX: 23.35 KG/M2 | DIASTOLIC BLOOD PRESSURE: 82 MMHG | WEIGHT: 187.8 LBS | SYSTOLIC BLOOD PRESSURE: 130 MMHG | HEIGHT: 75 IN

## 2017-08-22 DIAGNOSIS — R10.13 EPIGASTRIC PAIN: Primary | ICD-10-CM

## 2017-08-22 PROCEDURE — 99204 OFFICE O/P NEW MOD 45 MIN: CPT | Performed by: INTERNAL MEDICINE

## 2017-08-22 RX ORDER — SODIUM CHLORIDE 0.9 % (FLUSH) 0.9 %
1-10 SYRINGE (ML) INJECTION AS NEEDED
Status: CANCELLED | OUTPATIENT
Start: 2017-08-31

## 2017-08-22 NOTE — PROGRESS NOTES
"Chief Complaint   Patient presents with   • Abdominal Pain   • Heartburn     Subjective      HPI     Paul West is a 31 y.o. male who presents for evaluation of \"stomach issues.\"  Over last 6-9 mos pt has noticed sensation of \"tightness\" in upper abdomen.  He has intermittent eructation.  He reports an occasional sensation of intense hunger after using the restroom.  Bowel frequency is slightly increased.  He is having occasional heartburn.  He has been started on Omeprazole recently with some improvement in sx.  He has used NSAIDs in the past regularly but not in last month.  No significant weight loss or nocturnal sx.      History reviewed. No pertinent past medical history.    Current Outpatient Prescriptions:   •  naproxen (NAPROSYN) 500 MG tablet, Take 1 tablet by mouth 2 (Two) Times a Day With Meals. For low back pain, strain, Disp: 60 tablet, Rfl: 1  •  omeprazole (priLOSEC) 40 MG capsule, Take 1 capsule by mouth Daily., Disp: 30 capsule, Rfl: 1  •  valACYclovir (VALTREX) 500 MG tablet, TAKE 1 TABLET BY MOUTH TWICE DAILY FOR 5 DAYS NOW, THEN TAKE 1 TAB TWICE DAILY FOR 3 DAYS AS NEEDED FOR ANY REOCCURRENCE, Disp: 16 tablet, Rfl: 0     No Known Allergies  Social History     Social History   • Marital status: Single     Spouse name: N/A   • Number of children: 0   • Years of education: College     Occupational History   • [employer]      Social History Main Topics   • Smoking status: Never Smoker   • Smokeless tobacco: Never Used   • Alcohol use Yes   • Drug use: Yes   • Sexual activity: Defer     Other Topics Concern   • Not on file     Social History Narrative     Family History   Problem Relation Age of Onset   • Prostate cancer Father    • Colon cancer Paternal Grandfather      Review of Systems   Gastrointestinal: Positive for abdominal pain and nausea.   All other systems reviewed and are negative.    Objective   Vitals:    08/22/17 1544   BP: 130/82   Temp: 98.1 °F (36.7 °C)     Physical Exam "   Constitutional: He is oriented to person, place, and time. He appears well-developed and well-nourished.   HENT:   Head: Normocephalic and atraumatic.   Mouth/Throat: Oropharynx is clear and moist.   Eyes: Conjunctivae are normal. No scleral icterus.   Neck: Normal range of motion. Neck supple. No thyromegaly present.   Cardiovascular: Normal rate and regular rhythm.  Exam reveals no friction rub.    No murmur heard.  Pulmonary/Chest: Effort normal and breath sounds normal. No respiratory distress. He has no wheezes. He has no rales. He exhibits no tenderness.   Abdominal: Soft. Bowel sounds are normal. He exhibits no distension and no mass. There is no tenderness. There is no rebound and no guarding. No hernia.   Musculoskeletal: He exhibits no edema.   Lymphadenopathy:     He has no cervical adenopathy.     He has no axillary adenopathy.   Neurological: He is alert and oriented to person, place, and time.   Skin: Skin is warm and dry. No rash noted.   Psychiatric: He has a normal mood and affect. His behavior is normal. Judgment and thought content normal.   Vitals reviewed.    Assessment/Plan      Assessment:     1. Epigastric pain    2.      Dyspepsia      Plan:   Case request for EGD submitted  Trial of Adele Amaya M.D.  [facility]  [address] [place] [address]  Office: [phone]

## 2017-08-23 PROBLEM — R10.13 EPIGASTRIC PAIN: Status: ACTIVE | Noted: 2017-08-23

## 2017-08-30 RX ORDER — VALACYCLOVIR HYDROCHLORIDE 500 MG/1
500 TABLET, FILM COATED ORAL 2 TIMES DAILY
COMMUNITY
End: 2018-01-29 | Stop reason: SDUPTHER

## 2017-08-31 ENCOUNTER — ANESTHESIA (OUTPATIENT)
Dept: GASTROENTEROLOGY | Facility: HOSPITAL | Age: 32
End: 2017-08-31

## 2017-08-31 ENCOUNTER — ANESTHESIA EVENT (OUTPATIENT)
Dept: GASTROENTEROLOGY | Facility: HOSPITAL | Age: 32
End: 2017-08-31

## 2017-08-31 ENCOUNTER — HOSPITAL ENCOUNTER (OUTPATIENT)
Facility: HOSPITAL | Age: 32
Setting detail: HOSPITAL OUTPATIENT SURGERY
Discharge: HOME | End: 2017-08-31
Attending: INTERNAL MEDICINE | Admitting: INTERNAL MEDICINE

## 2017-08-31 VITALS
SYSTOLIC BLOOD PRESSURE: 115 MMHG | HEIGHT: 75 IN | RESPIRATION RATE: 16 BRPM | OXYGEN SATURATION: 97 % | TEMPERATURE: 98 F | WEIGHT: 188 LBS | HEART RATE: 67 BPM | BODY MASS INDEX: 23.38 KG/M2 | DIASTOLIC BLOOD PRESSURE: 70 MMHG

## 2017-08-31 DIAGNOSIS — R10.13 EPIGASTRIC PAIN: ICD-10-CM

## 2017-08-31 PROCEDURE — 25010000002 PROPOFOL 10 MG/ML EMULSION: Performed by: ANESTHESIOLOGY

## 2017-08-31 PROCEDURE — 88312 SPECIAL STAINS GROUP 1: CPT | Performed by: INTERNAL MEDICINE

## 2017-08-31 PROCEDURE — 43239 EGD BIOPSY SINGLE/MULTIPLE: CPT | Performed by: INTERNAL MEDICINE

## 2017-08-31 PROCEDURE — 88305 TISSUE EXAM BY PATHOLOGIST: CPT | Performed by: INTERNAL MEDICINE

## 2017-08-31 RX ORDER — SODIUM CHLORIDE, SODIUM LACTATE, POTASSIUM CHLORIDE, CALCIUM CHLORIDE 600; 310; 30; 20 MG/100ML; MG/100ML; MG/100ML; MG/100ML
1000 INJECTION, SOLUTION INTRAVENOUS CONTINUOUS PRN
Status: DISCONTINUED | OUTPATIENT
Start: 2017-08-31 | End: 2017-08-31 | Stop reason: HOSPADM

## 2017-08-31 RX ORDER — PROPOFOL 10 MG/ML
VIAL (ML) INTRAVENOUS AS NEEDED
Status: DISCONTINUED | OUTPATIENT
Start: 2017-08-31 | End: 2017-08-31 | Stop reason: SURG

## 2017-08-31 RX ORDER — PROPOFOL 10 MG/ML
VIAL (ML) INTRAVENOUS CONTINUOUS PRN
Status: DISCONTINUED | OUTPATIENT
Start: 2017-08-31 | End: 2017-08-31 | Stop reason: SURG

## 2017-08-31 RX ORDER — LIDOCAINE HYDROCHLORIDE 20 MG/ML
INJECTION, SOLUTION INFILTRATION; PERINEURAL AS NEEDED
Status: DISCONTINUED | OUTPATIENT
Start: 2017-08-31 | End: 2017-08-31 | Stop reason: SURG

## 2017-08-31 RX ADMIN — LIDOCAINE HYDROCHLORIDE 60 MG: 20 INJECTION, SOLUTION INFILTRATION; PERINEURAL at 14:07

## 2017-08-31 RX ADMIN — SODIUM CHLORIDE, POTASSIUM CHLORIDE, SODIUM LACTATE AND CALCIUM CHLORIDE 1000 ML: 600; 310; 30; 20 INJECTION, SOLUTION INTRAVENOUS at 13:02

## 2017-08-31 RX ADMIN — PROPOFOL 100 MCG/KG/MIN: 10 INJECTION, EMULSION INTRAVENOUS at 14:05

## 2017-08-31 RX ADMIN — PROPOFOL 100 MG: 10 INJECTION, EMULSION INTRAVENOUS at 14:05

## 2017-08-31 RX ADMIN — SODIUM CHLORIDE, POTASSIUM CHLORIDE, SODIUM LACTATE AND CALCIUM CHLORIDE: 600; 310; 30; 20 INJECTION, SOLUTION INTRAVENOUS at 14:05

## 2017-09-01 LAB
LAB AP CASE REPORT: NORMAL
Lab: NORMAL
PATH REPORT.FINAL DX SPEC: NORMAL
PATH REPORT.GROSS SPEC: NORMAL

## 2017-09-01 NOTE — H&P (VIEW-ONLY)
"Chief Complaint   Patient presents with   • Abdominal Pain   • Heartburn     Subjective      HPI     Paul West is a 31 y.o. male who presents for evaluation of \"stomach issues.\"  Over last 6-9 mos pt has noticed sensation of \"tightness\" in upper abdomen.  He has intermittent eructation.  He reports an occasional sensation of intense hunger after using the restroom.  Bowel frequency is slightly increased.  He is having occasional heartburn.  He has been started on Omeprazole recently with some improvement in sx.  He has used NSAIDs in the past regularly but not in last month.  No significant weight loss or nocturnal sx.      History reviewed. No pertinent past medical history.    Current Outpatient Prescriptions:   •  naproxen (NAPROSYN) 500 MG tablet, Take 1 tablet by mouth 2 (Two) Times a Day With Meals. For low back pain, strain, Disp: 60 tablet, Rfl: 1  •  omeprazole (priLOSEC) 40 MG capsule, Take 1 capsule by mouth Daily., Disp: 30 capsule, Rfl: 1  •  valACYclovir (VALTREX) 500 MG tablet, TAKE 1 TABLET BY MOUTH TWICE DAILY FOR 5 DAYS NOW, THEN TAKE 1 TAB TWICE DAILY FOR 3 DAYS AS NEEDED FOR ANY REOCCURRENCE, Disp: 16 tablet, Rfl: 0     No Known Allergies  Social History     Social History   • Marital status: Single     Spouse name: N/A   • Number of children: 0   • Years of education: College     Occupational History   • [employer]      Social History Main Topics   • Smoking status: Never Smoker   • Smokeless tobacco: Never Used   • Alcohol use Yes   • Drug use: Yes   • Sexual activity: Defer     Other Topics Concern   • Not on file     Social History Narrative     Family History   Problem Relation Age of Onset   • Prostate cancer Father    • Colon cancer Paternal Grandfather      Review of Systems   Gastrointestinal: Positive for abdominal pain and nausea.   All other systems reviewed and are negative.    Objective   Vitals:    08/22/17 1544   BP: 130/82   Temp: 98.1 °F (36.7 °C)     Physical Exam   "   Constitutional: He is oriented to person, place, and time. He appears well-developed and well-nourished.   HENT:   Head: Normocephalic and atraumatic.   Mouth/Throat: Oropharynx is clear and moist.   Eyes: Conjunctivae are normal. No scleral icterus.   Neck: Normal range of motion. Neck supple. No thyromegaly present.   Cardiovascular: Normal rate and regular rhythm.  Exam reveals no friction rub.    No murmur heard.  Pulmonary/Chest: Effort normal and breath sounds normal. No respiratory distress. He has no wheezes. He has no rales. He exhibits no tenderness.   Abdominal: Soft. Bowel sounds are normal. He exhibits no distension and no mass. There is no tenderness. There is no rebound and no guarding. No hernia.   Musculoskeletal: He exhibits no edema.   Lymphadenopathy:     He has no cervical adenopathy.     He has no axillary adenopathy.   Neurological: He is alert and oriented to person, place, and time.   Skin: Skin is warm and dry. No rash noted.   Psychiatric: He has a normal mood and affect. His behavior is normal. Judgment and thought content normal.   Vitals reviewed.    Assessment/Plan      Assessment:     1. Epigastric pain    2.      Dyspepsia      Plan:   Case request for EGD submitted  Trial of Adele Amaya M.D.  [facility]  [address] [place] [address]  Office: [phone]

## 2017-09-07 RX ORDER — OMEPRAZOLE 40 MG/1
CAPSULE, DELAYED RELEASE ORAL
Qty: 30 CAPSULE | Refills: 1 | Status: SHIPPED | OUTPATIENT
Start: 2017-09-07 | End: 2021-09-19

## 2017-09-12 NOTE — PROGRESS NOTES
Some inflammation in stomach but no H pylori.  Can we send rx for Dexilant 60mg/day x 6 weeks, and o/v in 4-6 weeks.  Thanks.

## 2017-09-13 ENCOUNTER — TELEPHONE (OUTPATIENT)
Dept: GASTROENTEROLOGY | Facility: CLINIC | Age: 32
End: 2017-09-13

## 2017-09-13 NOTE — TELEPHONE ENCOUNTER
Called pt and advised that per Dr Amaya: the biopsy from his stomach showed some inflammation but was negative for the bacteria called H pylori. Advised that MD recommends to stop the omeprazole and start a med called Dexilant that I will call into his pharmacy. Advised he will take this for 6 weeks and MD wants him to f/u in 4-6 weeks. Pt verb understanding. Appt scheduled for 10/13/17 at 8 AM.

## 2017-09-13 NOTE — TELEPHONE ENCOUNTER
----- Message from Jaime Amaya MD sent at 9/12/2017  7:49 AM EDT -----  Some inflammation in stomach but no H pylori.  Can we send rx for Dexilant 60mg/day x 6 weeks, and o/v in 4-6 weeks.  Thanks.

## 2017-10-12 RX ORDER — DEXLANSOPRAZOLE 60 MG/1
60 CAPSULE, DELAYED RELEASE ORAL DAILY
Qty: 30 CAPSULE | Refills: 1 | Status: SHIPPED | OUTPATIENT
Start: 2017-10-12 | End: 2017-11-11

## 2017-10-18 ENCOUNTER — DOCUMENTATION (OUTPATIENT)
Dept: GASTROENTEROLOGY | Facility: CLINIC | Age: 32
End: 2017-10-18

## 2017-10-23 ENCOUNTER — DOCUMENTATION (OUTPATIENT)
Dept: GASTROENTEROLOGY | Facility: CLINIC | Age: 32
End: 2017-10-23

## 2017-11-29 ENCOUNTER — TELEPHONE (OUTPATIENT)
Dept: GASTROENTEROLOGY | Facility: CLINIC | Age: 32
End: 2017-11-29

## 2018-01-29 RX ORDER — VALACYCLOVIR HYDROCHLORIDE 500 MG/1
TABLET, FILM COATED ORAL
Qty: 16 TABLET | Refills: 2 | Status: SHIPPED | OUTPATIENT
Start: 2018-01-29 | End: 2021-11-23 | Stop reason: SDUPTHER

## 2018-02-07 ENCOUNTER — OFFICE VISIT (OUTPATIENT)
Dept: FAMILY MEDICINE CLINIC | Facility: CLINIC | Age: 33
End: 2018-02-07

## 2018-02-07 VITALS
BODY MASS INDEX: 23.75 KG/M2 | HEIGHT: 75 IN | OXYGEN SATURATION: 97 % | WEIGHT: 191 LBS | HEART RATE: 104 BPM | DIASTOLIC BLOOD PRESSURE: 70 MMHG | SYSTOLIC BLOOD PRESSURE: 110 MMHG

## 2018-02-07 DIAGNOSIS — R10.32 LEFT INGUINAL PAIN: Primary | ICD-10-CM

## 2018-02-07 PROCEDURE — 99214 OFFICE O/P EST MOD 30 MIN: CPT | Performed by: NURSE PRACTITIONER

## 2018-02-07 RX ORDER — CELECOXIB 200 MG/1
200 CAPSULE ORAL DAILY
Qty: 30 CAPSULE | Refills: 0 | Status: SHIPPED | OUTPATIENT
Start: 2018-02-07 | End: 2018-02-15 | Stop reason: CLARIF

## 2018-02-07 NOTE — PATIENT INSTRUCTIONS
Discharge instructions    Celebrex for inflammation  Avoid straining  Avoid lifting greater than 10 pounds until you see surgery    If severe pain fever  Malaise  Emergency room    Follow-up routine care    Thank You,      James Epley, NP        Inguinal Hernia, Adult  Introduction  An inguinal hernia is when fat or the intestines push through the area where the leg meets the lower abdomen (groin) and create a rounded lump (bulge). This condition develops over time. There are three types of inguinal hernias. These types include:  · Hernias that can be pushed back into the belly (are reducible).  · Hernias that are not reducible (are incarcerated).  · Hernias that are not reducible and lose their blood supply (are strangulated). This type of hernia requires emergency surgery.  What are the causes?  This condition is caused by having a weak spot in the muscles or tissue. This weakness lets the hernia poke through. This condition can be triggered by:  · Suddenly straining the muscles of the lower abdomen.  · Lifting heavy objects.  · Straining to have a bowel movement. Difficult bowel movements (constipation) can lead to this.  · Coughing.  What increases the risk?  This condition is more likely to develop in:  · Men.  · Pregnant women.  · People who:  ¨ Are overweight.  ¨ Work in jobs that require long periods of standing or heavy lifting.  ¨ Have had an inguinal hernia before.  ¨ Smoke or have lung disease. These factors can lead to long-lasting (chronic) coughing.  What are the signs or symptoms?  Symptoms can depend on the size of the hernia. Often, a small inguinal hernia has no symptoms. Symptoms of a larger hernia include:  · A lump in the groin. This is easier to see when the person is standing. It might not be visible when he or she is lying down.  · Pain or burning in the groin. This occurs especially when lifting, straining, or coughing.  · A dull ache or a feeling of pressure in the groin.  · A lump in the  scrotum in men.  Symptoms of a strangulated inguinal hernia can include:  · A bulge in the groin that is very painful and tender to the touch.  · A bulge that turns red or purple.  · Fever, nausea, and vomiting.  · The inability to have a bowel movement or to pass gas.  How is this diagnosed?  This condition is diagnosed with a medical history and physical exam. Your health care provider may feel your groin area and ask you to cough.  How is this treated?  Treatment for this condition varies depending on the size of your hernia and whether you have symptoms. If you do not have symptoms, your health care provider may have you watch your hernia carefully and come in for follow-up visits. If your hernia is larger or if you have symptoms, your treatment will include surgery.  Follow these instructions at home:  Lifestyle  · Drink enough fluid to keep your urine clear or pale yellow.  · Eat a diet that includes a lot of fiber. Eat plenty of fruits, vegetables, and whole grains. Talk with your health care provider if you have questions.  · Avoid lifting heavy objects.  · Avoid standing for long periods of time.  · Do not use tobacco products, including cigarettes, chewing tobacco, or e-cigarettes. If you need help quitting, ask your health care provider.  · Maintain a healthy weight.  General instructions  · Do not try to force the hernia back in.  · Watch your hernia for any changes in color or size. Let your health care provider know if any changes occur.  · Take over-the-counter and prescription medicines only as told by your health care provider.  · Keep all follow-up visits as told by your health care provider. This is important.  Contact a health care provider if:  · You have a fever.  · You have new symptoms.  · Your symptoms get worse.  Get help right away if:  · You have pain in the groin that suddenly gets worse.  · A bulge in the groin gets bigger suddenly and does not go down.  · You are a man and you have a  sudden pain in the scrotum, or the size of your scrotum suddenly changes.  · A bulge in the groin area becomes red or purple and is painful to the touch.  · You have nausea or vomiting that does not go away.  · You feel your heart beating a lot more quickly than normal.  · You cannot have a bowel movement or pass gas.  This information is not intended to replace advice given to you by your health care provider. Make sure you discuss any questions you have with your health care provider.  Document Released: 05/06/2010 Document Revised: 05/25/2017 Document Reviewed: 10/28/2015  © 2017 [employer]

## 2018-02-07 NOTE — PROGRESS NOTES
Subjective   Paul West is a 32 y.o. male.     HPI Comments: Patient complains mild PainLeft groin, radiates to left testicle left upper thigh  Exacerbated by body position  Does not require analgesics  No associated symptoms  No dysuria frequency urgency no testicular swelling or tenderness  No recent injury  No fever chills appetite change  Bowel movements are normal    He has no history of hernia or STD             The following portions of the patient's history were reviewed and updated as appropriate: allergies, current medications, past medical history, past social history, past surgical history and problem list.    Review of Systems   Genitourinary:        Left groin pain mild       Objective   Physical Exam   Constitutional: He is oriented to person, place, and time. He appears well-developed and well-nourished.   HENT:   Head: Normocephalic.   Nose: Nose normal.   Mouth/Throat: Oropharynx is clear and moist.   Tm clear kobe no mass canal patent without d/c   Eyes: Conjunctivae are normal. Pupils are equal, round, and reactive to light. No scleral icterus.   Neck: Neck supple. No JVD present. No thyromegaly present.   Cardiovascular: Normal rate, regular rhythm and normal heart sounds.  Exam reveals no gallop and no friction rub.    No murmur heard.  Pulmonary/Chest: Effort normal and breath sounds normal. No stridor. No respiratory distress. He has no wheezes. He has no rales.   Abdominal: Soft. Bowel sounds are normal. He exhibits no distension. There is no tenderness.   No hepatosplenomegaly, no ascites,   Genitourinary:   Genitourinary Comments: Examine patient lying and standing  Standing with valv  No bulging  Inguinal no mass  Right inguinal canal normal  Left inguinal canal small amount of bulging  Minimal tenderness  Scrotum otherwise normal without edema testes normal nontender   Musculoskeletal: He exhibits no edema or tenderness.   Lymphadenopathy:     He has no cervical adenopathy.    Neurological: He is alert and oriented to person, place, and time. He has normal reflexes.   Skin: Skin is warm and dry. No rash noted. No erythema.   Psychiatric: He has a normal mood and affect. His behavior is normal. Judgment and thought content normal.   Vitals reviewed.      Assessment/Plan   Paul was seen today for inner thigh and lower stomach pain x 1-2 weeks.    Diagnoses and all orders for this visit:    Left inguinal pain  Comments:  Small amount inguinal canal bulging, minimal tenderness no incarceration  Orders:  -     Ambulatory Referral to General Surgery    Other orders  -     celecoxib (CELEBREX) 200 MG capsule; Take 1 capsule by mouth Daily.                  Patient does have a small Degree of inguinal bulging approximately One- to 1.5 soon  Mild tenderness  No definite hernia  Suspect his symptoms are likely to groin strain are possible left inguinal hernia developing  No evidence of incarceration  Is likely risk however developing a progressive hernia  You should avoid straining  Refer to general surgery evaluation    If you has increase her persistent pain fever chills swelling  Emergency room  Discuss the risk of incarceration and dangers signs and symptoms    Discharge instructions    Celebrex for inflammation  Avoid straining  Avoid lifting greater than 10 pounds until you see surgery    If severe pain fever  Malaise  Emergency room    Follow-up routine care    Thank You,      James Epley, NP

## 2018-02-15 ENCOUNTER — OFFICE VISIT (OUTPATIENT)
Dept: SURGERY | Facility: CLINIC | Age: 33
End: 2018-02-15

## 2018-02-15 VITALS
DIASTOLIC BLOOD PRESSURE: 76 MMHG | WEIGHT: 193.5 LBS | SYSTOLIC BLOOD PRESSURE: 120 MMHG | HEART RATE: 77 BPM | BODY MASS INDEX: 24.19 KG/M2 | OXYGEN SATURATION: 98 %

## 2018-02-15 DIAGNOSIS — K40.90 LEFT INGUINAL HERNIA: Primary | ICD-10-CM

## 2018-02-15 PROCEDURE — 99242 OFF/OP CONSLTJ NEW/EST SF 20: CPT | Performed by: SURGERY

## 2018-02-15 RX ORDER — MELOXICAM 15 MG/1
TABLET ORAL
COMMUNITY
Start: 2018-02-13 | End: 2018-10-09

## 2018-02-15 NOTE — PROGRESS NOTES
Subjective   Paul West is a 32 y.o. male who presents to the office in surgical consultation from James Epley, APRN for a left inguinal hernia.    History of Present Illness     The patient recently developed pain in the left groin as well as paresthesias along the left thigh.  The symptoms have resolved.  He was seen by his primary care provider and noted to have a small left inguinal hernia.  He has not had any change in his bowel or bladder function.    Review of Systems   Constitutional: Negative for activity change, appetite change, fatigue and fever.   HENT: Negative for trouble swallowing and voice change.    Respiratory: Negative for chest tightness and shortness of breath.    Cardiovascular: Negative for chest pain and palpitations.   Gastrointestinal: Positive for abdominal pain. Negative for blood in stool, constipation, diarrhea, nausea and vomiting.   Endocrine: Negative for cold intolerance and heat intolerance.   Genitourinary: Negative for dysuria and flank pain.   Neurological: Negative for dizziness and light-headedness.   Hematological: Negative for adenopathy. Does not bruise/bleed easily.   Psychiatric/Behavioral: Negative for agitation and confusion.     Past Medical History:   Diagnosis Date   • GERD (gastroesophageal reflux disease)    • Indigestion      Past Surgical History:   Procedure Laterality Date   • ENDOSCOPY N/A 8/31/2017    normal esophagus , gastritis, path mild imflammation.   • FINGER SURGERY Right 2005    middle finger   • WISDOM TOOTH EXTRACTION  12/2004     Family History   Problem Relation Age of Onset   • Prostate cancer Father    • Colon cancer Paternal Grandfather    • Malig Hyperthermia Neg Hx      Social History     Social History   • Marital status: Single     Spouse name: N/A   • Number of children: 0   • Years of education: College     Occupational History   • [employer]      Social History Main Topics   • Smoking status: Never Smoker   • Smokeless  tobacco: Never Used   • Alcohol use Yes      Comment: SOCIAL   • Drug use: No   • Sexual activity: Defer     Other Topics Concern   • Not on file     Social History Narrative       Objective   Physical Exam   Constitutional: He is oriented to person, place, and time. He appears well-developed and well-nourished.  Non-toxic appearance.   Eyes: EOM are normal. No scleral icterus.   Pulmonary/Chest: Effort normal. No respiratory distress.   Abdominal: Soft. Normal appearance. There is no tenderness. A hernia is present. Hernia confirmed positive in the left inguinal area (Small reducible left inguinal hernia). Hernia confirmed negative in the right inguinal area.   Neurological: He is alert and oriented to person, place, and time.   Skin: Skin is warm and dry.   Psychiatric: He has a normal mood and affect. His behavior is normal. Judgment and thought content normal.       Assessment/Plan       The encounter diagnosis was Left inguinal hernia.    The patient has a small left inguinal hernia that was recently symptomatic.  This was discussed at length with him.  He was offered a left inguinal hernia repair.  He would like to avoid surgery at this time.  Since the defect is too small to permit the small bowel, surgery is not mandatory at this time.  He will follow his symptoms and return to the office if a bulge becomes noticeable or if symptoms of pain recur.

## 2018-10-09 ENCOUNTER — OFFICE VISIT (OUTPATIENT)
Dept: FAMILY MEDICINE CLINIC | Facility: CLINIC | Age: 33
End: 2018-10-09

## 2018-10-09 VITALS
TEMPERATURE: 99.2 F | BODY MASS INDEX: 23.13 KG/M2 | OXYGEN SATURATION: 97 % | SYSTOLIC BLOOD PRESSURE: 100 MMHG | HEART RATE: 104 BPM | DIASTOLIC BLOOD PRESSURE: 68 MMHG | WEIGHT: 186 LBS | HEIGHT: 75 IN

## 2018-10-09 DIAGNOSIS — K59.00 CONSTIPATION, UNSPECIFIED CONSTIPATION TYPE: ICD-10-CM

## 2018-10-09 DIAGNOSIS — H69.83 EUSTACHIAN TUBE DYSFUNCTION, BILATERAL: Primary | ICD-10-CM

## 2018-10-09 DIAGNOSIS — R10.9 ABDOMINAL CRAMPING: ICD-10-CM

## 2018-10-09 DIAGNOSIS — H93.13 TINNITUS OF BOTH EARS: ICD-10-CM

## 2018-10-09 PROCEDURE — 99213 OFFICE O/P EST LOW 20 MIN: CPT | Performed by: NURSE PRACTITIONER

## 2018-10-09 RX ORDER — FLUTICASONE PROPIONATE 50 MCG
2 SPRAY, SUSPENSION (ML) NASAL DAILY
Qty: 1 BOTTLE | Refills: 3 | Status: SHIPPED | OUTPATIENT
Start: 2018-10-09 | End: 2020-05-21

## 2018-10-09 NOTE — PATIENT INSTRUCTIONS
"Discharge instructions      Metamucil orange flavor smooth  Start slowly at dinnertime and gradually increase with large glass of water    Decrease gaseous foods    \"Low  FODMAP diet\" gassy foods    Glascock diet small more frequent meals    Avoid high sweets    Gradually reintroduce  As tolerated over the next 6-8 weeks    See gastroenterology if symptoms persist as discussed    Flonase for eustachian tube dysfunction    Any persistent ear ringing  Beyond 6 weeks call for referral            "

## 2018-10-09 NOTE — PROGRESS NOTES
Subjective   Paul West is a 32 y.o. male.     Patient complains last 2-3 months  Some constipation change in bowel habits  Sometimes semisolid  Mild lower extremity cramping  No rectal bleeding no nausea vomiting May have to go 2 or 3 times in a short period   No fever chills night sweats or unexplained weight loss  He is eating better eating much more salads and vegetables  He's having no epigastric complaints EGD last year GERD symptoms which have  Improved dramatically    Severe pressure  Ringing in ears bilateral  No injury  No excessive loud no wheeze recently no hearing problems  Recently treated for sinus infection finished Augmentin less than 2 weeks ago  He has no increased abdominal pain fever or diarrhea since the Augmentin or signs or symptoms of C. Difficile  Outpatient urgent care          Abdominal Pain   Associated symptoms include constipation. Pertinent negatives include no fever.   Fatigue   Associated symptoms include abdominal pain and fatigue. Pertinent negatives include no chills or fever.        The following portions of the patient's history were reviewed and updated as appropriate: allergies, current medications, past medical history, past social history, past surgical history and problem list.    Review of Systems   Constitutional: Positive for fatigue. Negative for chills and fever.   HENT:        Ear ringing sinus pressure   Gastrointestinal: Positive for abdominal pain and constipation.   All other systems reviewed and are negative.      Objective   Physical Exam   Constitutional: He is oriented to person, place, and time. He appears well-developed and well-nourished. No distress.   HENT:   Head: Normocephalic and atraumatic.   Nose: Nose normal.   Mouth/Throat: Oropharynx is clear and moist.   Canal clear TM clear good reflex a little full turbinates congested   Eyes: Pupils are equal, round, and reactive to light. Conjunctivae are normal. No scleral icterus.   Neck: Neck  "supple. No JVD present. No thyromegaly present.   Cardiovascular: Normal rate, regular rhythm and normal heart sounds.  Exam reveals no gallop and no friction rub.    No murmur heard.  Pulmonary/Chest: Effort normal and breath sounds normal. No respiratory distress. He has no wheezes. He has no rales.   Abdominal: Soft. Bowel sounds are normal. He exhibits no distension and no mass. There is no tenderness. There is no guarding. No hernia.   Musculoskeletal: He exhibits no edema or tenderness.   Lymphadenopathy:     He has no cervical adenopathy.   Neurological: He is alert and oriented to person, place, and time. He has normal reflexes.   Skin: Skin is warm and dry. No rash noted. He is not diaphoretic. No erythema.   Psychiatric: He has a normal mood and affect. His behavior is normal. Judgment and thought content normal.   Vitals reviewed.        Assessment/Plan   Paul was seen today for abdominal pain and fatigue.    Diagnoses and all orders for this visit:    Eustachian tube dysfunction, bilateral    Tinnitus of both ears    Constipation, unspecified constipation type    Abdominal cramping    Other orders  -     fluticasone (FLONASE) 50 MCG/ACT nasal spray; 2 sprays into the nostril(s) as directed by provider Daily.                  Discharge instructions      Metamucil orange flavor smooth  Start slowly at dinnertime and gradually increase with large glass of water    Decrease gaseous foods    \"Low  FODMAP diet\" gassy foods    Gregory diet small more frequent meals    Avoid high sweets    Gradually reintroduce  As tolerated over the next 6-8 weeks    See gastroenterology if symptoms persist as discussed    Flonase for eustachian tube dysfunction    Any persistent ear ringing  Beyond 6 weeks call for referral    Always use hearing protection    "

## 2019-01-25 ENCOUNTER — TELEPHONE (OUTPATIENT)
Dept: FAMILY MEDICINE CLINIC | Facility: CLINIC | Age: 34
End: 2019-01-25

## 2019-01-28 DIAGNOSIS — M79.673 PAIN OF FOOT, UNSPECIFIED LATERALITY: Primary | ICD-10-CM

## 2019-06-04 NOTE — TELEPHONE ENCOUNTER
Patient is calling for a referral to a podiatrist . Please advise     [phone]      "I'm having breast reconstruction"

## 2019-09-18 ENCOUNTER — OFFICE VISIT (OUTPATIENT)
Dept: FAMILY MEDICINE CLINIC | Facility: CLINIC | Age: 34
End: 2019-09-18

## 2019-09-18 VITALS
WEIGHT: 200 LBS | TEMPERATURE: 98.3 F | HEIGHT: 75 IN | DIASTOLIC BLOOD PRESSURE: 80 MMHG | BODY MASS INDEX: 24.87 KG/M2 | SYSTOLIC BLOOD PRESSURE: 118 MMHG | HEART RATE: 71 BPM | OXYGEN SATURATION: 96 %

## 2019-09-18 DIAGNOSIS — H69.83 EUSTACHIAN TUBE DYSFUNCTION, BILATERAL: Primary | ICD-10-CM

## 2019-09-18 DIAGNOSIS — J30.2 SEASONAL ALLERGIC RHINITIS, UNSPECIFIED TRIGGER: ICD-10-CM

## 2019-09-18 DIAGNOSIS — J34.89 SINUS PRESSURE: ICD-10-CM

## 2019-09-18 PROCEDURE — 99214 OFFICE O/P EST MOD 30 MIN: CPT | Performed by: NURSE PRACTITIONER

## 2019-09-18 RX ORDER — FLUTICASONE PROPIONATE 50 MCG
2 SPRAY, SUSPENSION (ML) NASAL DAILY
Qty: 1 BOTTLE | Refills: 5 | Status: SHIPPED | OUTPATIENT
Start: 2019-09-18 | End: 2020-05-21 | Stop reason: SDUPTHER

## 2019-09-18 RX ORDER — CETIRIZINE HYDROCHLORIDE, PSEUDOEPHEDRINE HYDROCHLORIDE 5; 120 MG/1; MG/1
1 TABLET, FILM COATED, EXTENDED RELEASE ORAL 2 TIMES DAILY
Qty: 30 TABLET | Refills: 3 | Status: SHIPPED | OUTPATIENT
Start: 2019-09-18 | End: 2020-05-21 | Stop reason: SDUPTHER

## 2019-09-18 NOTE — PATIENT INSTRUCTIONS
Discharge instructions    Zyrtec-D twice daily x2 weeks then as needed  Flonase 2 sprays each nostril daily  OTC Nasalcrom  2 sprays each nostril twice daily for nasal allergies and congestion  [employer] $20 last 2 months    Follow-up call 6 weeks or email  If not improving significantly see ENT Dr. Yin for    Increased pain severe headache severe dizziness weakness emergency room

## 2019-09-18 NOTE — PROGRESS NOTES
Subjective   Paul West is a 33 y.o. male.     Patient complains sinus pressure discomfort ear pain ringing some dizziness drainage  X2 months originally urgent care did not improve followed up another antibiotic got some better but still has discomfort and now has some mild ringing right side  No vision change no headache no fever no chills  History of allergies sinus drainage  Over-the-counter antihistamine not helping that much  No history of recurrent sinusitis  Symptoms are mild to moderate      URI    Associated symptoms include ear pain and rhinorrhea.        The following portions of the patient's history were reviewed and updated as appropriate: allergies, current medications, past family history, past medical history, past social history, past surgical history and problem list.    Review of Systems   Constitutional: Negative for diaphoresis, fatigue and fever.   HENT: Positive for ear pain, postnasal drip, rhinorrhea and sinus pressure.    Respiratory: Negative for shortness of breath.    Neurological: Negative for headache.   All other systems reviewed and are negative.      Objective   Physical Exam   Constitutional: He is oriented to person, place, and time. He appears well-developed and well-nourished. No distress.   HENT:   Head: Normocephalic and atraumatic.   Nose: Nose normal.   Mouth/Throat: Oropharynx is clear and moist.   TM right full  Turbinates quite congested 3-4+ pharynx clear TMJ normal teeth normal nontender gums normal parotid normal no cervical adenopathy   Eyes: Conjunctivae and EOM are normal. Pupils are equal, round, and reactive to light.   No nystagmus   Neck: Neck supple. No JVD present.   Cardiovascular: Normal rate, regular rhythm and normal heart sounds.   No murmur heard.  Pulmonary/Chest: Effort normal and breath sounds normal. No respiratory distress. He has no wheezes.   Abdominal: Soft. Bowel sounds are normal. He exhibits no distension and no mass. There is no  tenderness. There is no guarding. No hernia.   Musculoskeletal: He exhibits no edema or tenderness.   Lymphadenopathy:     He has no cervical adenopathy.   Neurological: He is alert and oriented to person, place, and time.   Skin: Skin is warm and dry. He is not diaphoretic.   Psychiatric: He has a normal mood and affect. His behavior is normal. Judgment and thought content normal.   Vitals reviewed.        Assessment/Plan   Paul was seen today for uri.    Diagnoses and all orders for this visit:    Eustachian tube dysfunction, bilateral    Sinus pressure    Seasonal allergic rhinitis, unspecified trigger    Other orders  -     cetirizine-pseudoephedrine (ZyrTEC-D) 5-120 MG per 12 hr tablet; Take 1 tablet by mouth 2 (Two) Times a Day.  -     fluticasone (FLONASE) 50 MCG/ACT nasal spray; 2 sprays into the nostril(s) as directed by provider Daily.    Patient has symptoms of sinus congestion eustachian tube dysfunction likely related to allergies  He is taking 2 rounds of antibiotics within the last 2 months he has no sinus tenderness or direct sinus pain  Hopefully will get his sinuses more clear his symptoms were resolved  He will follow instructions below and he will follow-up Dr. Scott if not improving as well as a follow-up call        Risk-benefit Sudafed discussed stop if problems insomnia anxiety palpitations avoid chronic use      Patient Instructions   Discharge instructions    Zyrtec-D twice daily x2 weeks then as needed  Flonase 2 sprays each nostril daily  OTC Nasalcrom  2 sprays each nostril twice daily for nasal allergies and congestion  Amazon $20 last 2 months    Follow-up call 6 weeks or email  If not improving significantly see ENT Dr. Yin for    Increased pain severe headache severe dizziness weakness emergency room

## 2020-05-21 ENCOUNTER — TELEMEDICINE (OUTPATIENT)
Dept: FAMILY MEDICINE CLINIC | Facility: CLINIC | Age: 35
End: 2020-05-21

## 2020-05-21 DIAGNOSIS — H69.83 EUSTACHIAN TUBE DYSFUNCTION, BILATERAL: ICD-10-CM

## 2020-05-21 DIAGNOSIS — J30.9 ALLERGIC RHINITIS, UNSPECIFIED SEASONALITY, UNSPECIFIED TRIGGER: Primary | ICD-10-CM

## 2020-05-21 PROBLEM — H69.93 EUSTACHIAN TUBE DYSFUNCTION, BILATERAL: Status: ACTIVE | Noted: 2020-05-21

## 2020-05-21 PROCEDURE — 99213 OFFICE O/P EST LOW 20 MIN: CPT | Performed by: NURSE PRACTITIONER

## 2020-05-21 RX ORDER — CETIRIZINE HYDROCHLORIDE, PSEUDOEPHEDRINE HYDROCHLORIDE 5; 120 MG/1; MG/1
1 TABLET, FILM COATED, EXTENDED RELEASE ORAL 2 TIMES DAILY
Qty: 30 TABLET | Refills: 3 | Status: SHIPPED | OUTPATIENT
Start: 2020-05-21 | End: 2021-09-19

## 2020-05-21 RX ORDER — FLUTICASONE PROPIONATE 50 MCG
2 SPRAY, SUSPENSION (ML) NASAL DAILY
Qty: 3 BOTTLE | Refills: 3 | Status: SHIPPED | OUTPATIENT
Start: 2020-05-21 | End: 2021-09-19

## 2020-05-21 RX ORDER — AZELASTINE 1 MG/ML
2 SPRAY, METERED NASAL 2 TIMES DAILY
Qty: 1 EACH | Refills: 12 | Status: SHIPPED | OUTPATIENT
Start: 2020-05-21 | End: 2021-09-19

## 2020-05-21 NOTE — PROGRESS NOTES
Subjective   Paul West is a 34 y.o. male.     Patient complains of bilateral sinus pressure bilateral frontal bilateral maxillary ears popping ear pressure over the last year  Symptoms mild to moderate  Has a dull ache sometimes  Can affect hearing  Has no chronic hearing loss no chronic tinnitus no increasing pain tenderness fever chills  Does have some sneezing some postnasal drip and during periods of exacerbation in spring and winter fall  Symptoms are year-round with seasonal exacerbation    Has no sore throat body aches fever chills cough headache or other viral symptoms feels good otherwise    Social history no tobacco abuse    Past medical history healthy           There were no vitals taken for this visit.      The following portions of the patient's history were reviewed and updated as appropriate: allergies, current medications, past family history, past medical history, past social history, past surgical history and problem list.    Review of Systems   Constitutional: Negative for chills, fatigue and fever.   HENT: Positive for postnasal drip and sinus pressure. Negative for trouble swallowing.    Eyes: Negative.    Respiratory: Negative.  Negative for cough and shortness of breath.    Cardiovascular: Negative for chest pain, palpitations and leg swelling.   Gastrointestinal: Negative.  Negative for abdominal pain.   Genitourinary: Negative.    Musculoskeletal: Negative.    Skin: Negative.    Neurological: Negative.  Negative for dizziness and confusion.   Psychiatric/Behavioral: Negative.        Objective   Physical Exam   Constitutional: He appears well-developed and well-nourished.   Pleasant appears well   HENT:   Head: Normocephalic and atraumatic.   Speech clear no stridor no tenderness   Eyes: Pupils are equal, round, and reactive to light. Conjunctivae are normal.   Neck: Neck supple.   Pulmonary/Chest: Effort normal.   Unlabored able to complete sentences without dyspnea   Skin: Skin is  warm and dry. No rash noted. No erythema.   Psychiatric: He has a normal mood and affect. His behavior is normal. Judgment and thought content normal.   Vitals reviewed.        Assessment/Plan   Diagnoses and all orders for this visit:    Allergic rhinitis, unspecified seasonality, unspecified trigger    Eustachian tube dysfunction, bilateral    Other orders  -     fluticasone (Flonase) 50 MCG/ACT nasal spray; 2 sprays into the nostril(s) as directed by provider Daily.  -     azelastine (ASTELIN) 0.1 % nasal spray; 2 sprays into the nostril(s) as directed by provider 2 (Two) Times a Day. Use with nasal steroid  -     cetirizine-pseudoephedrine (ZyrTEC-D) 5-120 MG per 12 hr tablet; Take 1 tablet by mouth 2 (Two) Times a Day. For increased sinus pressure as needed    Patient has chronic allergic rhinitis  With seasonal exacerbation eustachian tube dysfunction  I suspect he will have quite a bit of improvement with the present plan otherwise he needs to see ENT for nasal scope for possible allergy asthma for testing shots    Risk-benefit medication Sudafed only for acute use  With caution  Increasing sinus pain tenderness urgent recheck    Any chronic tinnitus unilateral hearing loss see ENT he denies this today              There are no Patient Instructions on file for this visit.

## 2020-05-21 NOTE — PATIENT INSTRUCTIONS
Discharge instructions      Flonase 2 sprays each nostril daily  Astelin nasal spray 2 sprays each nostril twice daily  This is an antihistamine    This combination is very good at controlling chronic nasal allergies, which results in eustachian tube dysfunction and pressure and mucus problems in her sinuses.    For more acute or increase in symptoms not controlled by the above,  Zyrtec-D 12-hour decongestant  1 tablet every 12 hours as needed for increased sinus pressure and eustachian tube dysfunction    Use this over the next 7 days to 14 days  Then discontinue and use sparingly only as needed    Follow-up email 6 to 8 weeks  If not significant improvement you will need to see ear nose and throat physician and/or allergist    Any increasing sinus pain tenderness especially unilateral  Could represent a bacterial sinusitis and may need antibiotics?    Thank you!!

## 2020-07-27 DIAGNOSIS — J34.89 NASAL OBSTRUCTION: Primary | ICD-10-CM

## 2020-08-28 ENCOUNTER — OFFICE VISIT (OUTPATIENT)
Dept: FAMILY MEDICINE CLINIC | Facility: CLINIC | Age: 35
End: 2020-08-28

## 2020-08-28 VITALS
HEART RATE: 64 BPM | HEIGHT: 75 IN | DIASTOLIC BLOOD PRESSURE: 77 MMHG | WEIGHT: 198.6 LBS | OXYGEN SATURATION: 98 % | SYSTOLIC BLOOD PRESSURE: 120 MMHG | BODY MASS INDEX: 24.69 KG/M2 | TEMPERATURE: 97.3 F

## 2020-08-28 DIAGNOSIS — M54.12 CERVICAL RADICULOPATHY, ACUTE: Primary | ICD-10-CM

## 2020-08-28 PROCEDURE — 99214 OFFICE O/P EST MOD 30 MIN: CPT | Performed by: NURSE PRACTITIONER

## 2020-08-28 RX ORDER — NAPROXEN 500 MG/1
500 TABLET ORAL 2 TIMES DAILY WITH MEALS
Qty: 60 TABLET | Refills: 1 | Status: SHIPPED | OUTPATIENT
Start: 2020-08-28 | End: 2021-09-19

## 2020-08-28 RX ORDER — METHYLPREDNISOLONE 4 MG/1
TABLET ORAL
Qty: 21 TABLET | Refills: 0 | Status: SHIPPED | OUTPATIENT
Start: 2020-08-28 | End: 2021-09-19

## 2020-08-28 NOTE — PROGRESS NOTES
"Subjective   Paul West is a 34 y.o. male.     Patient complains of pain left trapezoid as well radiates with paresthesias down his entire extremity mostly lateral and posterior upper arm and forearm but radiates to all 5 fingers  No weakness he is not dropping things he has no fever no headache  Some discomfort left trap mostly paresthesias down the extremity symptoms approximately 1 month nothing making better or worse           /77   Pulse 64   Temp 97.3 °F (36.3 °C) (Temporal)   Ht 190.5 cm (75\")   Wt 90.1 kg (198 lb 9.6 oz)   SpO2 98%   BMI 24.82 kg/m²       The following portions of the patient's history were reviewed and updated as appropriate: allergies, current medications, past family history, past medical history, past social history, past surgical history and problem list.    Review of Systems   Musculoskeletal:        Paresthesias left upper extremity   All other systems reviewed and are negative.      Objective   Physical Exam   Constitutional: He appears well-developed and well-nourished.   HENT:   Head: Normocephalic and atraumatic.   Eyes: Pupils are equal, round, and reactive to light. Conjunctivae are normal.   Neck: Neck supple.   Pulmonary/Chest: Effort normal.   Musculoskeletal:   No cervical point tenderness normal range of motion but with patient turns his head to the left he reproduces the discomfort  Upper extremity neurovascular intact  strength normal reflexes intact   Skin: Skin is warm and dry. No rash noted. No erythema.   Psychiatric: He has a normal mood and affect. His behavior is normal. Judgment and thought content normal.   Vitals reviewed.        Assessment/Plan   Paul was seen today for poss pinch nerve in left arm.    Diagnoses and all orders for this visit:    Cervical radiculopathy, acute  -     Ambulatory Referral to Physical Therapy Evaluate and treat    Other orders  -     methylPREDNISolone (MEDROL) 4 MG dose pack; Take as directed on package " instructions.  -     naproxen (Naprosyn) 500 MG tablet; Take 1 tablet by mouth 2 (Two) Times a Day With Meals. For cervical radiculopathy      Symptoms of a pinched nerve or cervical radiculopathy risk-benefit Medrol followed by naproxen  Exercises physical therapy ergonomics  Weakness fever severe uncontrolled pain gait difficulties groin paresthesias incontinence retention emergency room  Follow-up 4 to 6 weeks        Patient Instructions   Discharge instructions  Stretching  Neck exercises to increase muscle tone for better support of the spine and hopefully help alleviate pressure as well as with the NSAIDs are for  Physical therapy most days of the week  Change ergonomics at work    Medrol Dosepak now  Followed by naproxen 500 mg twice daily with food and water for 3 to 6 weeks  [employer]now [employer] review this website wonderful website with exercises as well as videoed demonstrations    Chest pain shortness of breath nausea vomiting diaphoresis  Extremity weakness  Bowel or bladder retention incontinence  Gait abnormality lower extremity weakness weakness in the hand emergency room    Cervical Radiculopathy    Cervical radiculopathy happens when a nerve in the neck (a cervical nerve) is pinched or bruised. This condition can happen because of an injury to the cervical spine (vertebrae) in the neck, or as part of the normal aging process. Pressure on the cervical nerves can cause pain or numbness that travels from the neck all the way down into the arm and fingers. Usually, this condition gets better with rest. Treatment may be needed if the condition does not improve.  What are the causes?  This condition may be caused by:  · A neck injury.  · A bulging (herniated) disk.  · Muscle spasms.  · Muscle tightness in the neck because of overuse.  · Arthritis.  · Breakdown or degeneration in the bones and joints of the spine (spondylosis) due to aging.  · Bone spurs that may develop near the cervical nerves.  What are  the signs or symptoms?  Symptoms of this condition include:  · Pain. The pain may travel from the neck to the arm and hand. The pain can be severe or irritating. It may be worse when you move your neck.  · Numbness or tingling in your arm or hand.  · Weakness in the affected arm and hand, in severe cases.  How is this diagnosed?  This condition may be diagnosed based on your symptoms, your medical history, and a physical exam. You may also have tests, including:  · X-rays.  · A CT scan.  · An MRI.  · An electromyogram (EMG).  · Nerve conduction tests.  How is this treated?  In many cases, treatment is not needed for this condition. With rest, the condition usually gets better over time. If treatment is needed, options may include:  · Wearing a soft neck collar (cervical collar) for short periods of time, as told by your health care provider.  · Doing physical therapy to strengthen your neck muscles.  · Taking medicines, such as NSAIDs or oral corticosteroids.  · Having spinal injections, in severe cases.  · Having surgery. This may be needed if other treatments do not help. Different types of surgery may be done depending on the cause of this condition.  Follow these instructions at home:  If you have a cervical collar:  · Wear it as told by your health care provider. Remove it only as told by your health care provider.  · Ask your health care provider if you can remove the collar for cleaning and bathing. If you are allowed to remove the collar for cleaning or bathing:  ? Follow instructions from your health care provider about how to remove the collar safely.  ? Clean the collar by wiping it with mild soap and water and drying it completely.  ? Take out any removable pads in the collar every 1-2 days, and wash them by hand with soap and water. Let them air-dry completely before you put them back in the collar.  ? Check your skin under the collar for irritation or sores. If you see any, tell your health care  provider.  Managing pain         · Take over-the-counter and prescription medicines only as told by your health care provider.  · If directed, put ice on the affected area.  ? If you have a soft neck collar, remove it as told by your health care provider.  ? Put ice in a plastic bag.  ? Place a towel between your skin and the bag.  ? Leave the ice on for 20 minutes, 2-3 times a day.  · If applying ice does not help, you can try using heat. Use the heat source that your health care provider recommends, such as a moist heat pack or a heating pad.  ? Place a towel between your skin and the heat source.  ? Leave the heat on for 20-30 minutes.  ? Remove the heat if your skin turns bright red. This is especially important if you are unable to feel pain, heat, or cold. You may have a greater risk of getting burned.  · Try a gentle neck and shoulder massage to help relieve symptoms.  Activity  · Rest as needed.  · Return to your normal activities as told by your health care provider. Ask your health care provider what activities are safe for you.  · Do stretching and strengthening exercises as told by your health care provider or physical therapist.  · Do not lift anything that is heavier than 10 lb (4.5 kg) until your health care provider tells you that it is safe.  General instructions  · Use a flat pillow when you sleep.  · Do not drive while wearing a cervical collar. If you do not have a cervical collar, ask your health care provider if it is safe to drive while your neck heals.  · Ask your health care provider if the medicine prescribed to you requires you to avoid driving or using heavy machinery.  · Do not use any products that contain nicotine or tobacco, such as cigarettes, e-cigarettes, and chewing tobacco. These can delay healing. If you need help quitting, ask your health care provider.  · Keep all follow-up visits as told by your health care provider. This is important.  Contact a health care provider  if:  · Your condition does not improve with treatment.  Get help right away if:  · Your pain gets much worse and cannot be controlled with medicines.  · You have weakness or numbness in your hand, arm, face, or leg.  · You have a high fever.  · You have a stiff, rigid neck.  · You lose control of your bowels or your bladder (have incontinence).  · You have trouble with walking, balance, or speaking.  Summary  · Cervical radiculopathy happens when a nerve in the neck is pinched or bruised.  · A nerve can get pinched from a bulging disk, arthritis, muscle spasms, or an injury to the neck.  · Symptoms include pain, tingling, or numbness radiating from the neck into the arm or hand. Weakness can also occur in severe cases.  · Treatment may include rest, wearing a cervical collar, and physical therapy. Medicines may be prescribed to help with pain. In severe cases, injections or surgery may be needed.  This information is not intended to replace advice given to you by your health care provider. Make sure you discuss any questions you have with your health care provider.  Document Released: 09/12/2002 Document Revised: 11/08/2019 Document Reviewed: 11/08/2019  [employer] Patient Education © 2020 [employer]    Cervical Strain and Sprain Rehab  Ask your health care provider which exercises are safe for you. Do exercises exactly as told by your health care provider and adjust them as directed. It is normal to feel mild stretching, pulling, tightness, or discomfort as you do these exercises. Stop right away if you feel sudden pain or your pain gets worse. Do not begin these exercises until told by your health care provider.  Stretching and range-of-motion exercises  Cervical side bending    1. Using good posture, sit on a stable chair or stand up.  2. Without moving your shoulders, slowly tilt your left / right ear to your shoulder until you feel a stretch in the opposite side neck muscles. You should be looking straight  ahead.  3. Hold for __________ seconds.  4. Repeat with the other side of your neck.  Repeat __________ times. Complete this exercise __________ times a day.  Cervical rotation    1. Using good posture, sit on a stable chair or stand up.  2. Slowly turn your head to the side as if you are looking over your left / right shoulder.  ? Keep your eyes level with the ground.  ? Stop when you feel a stretch along the side and the back of your neck.  3. Hold for __________ seconds.  4. Repeat this by turning to your other side.  Repeat __________ times. Complete this exercise __________ times a day.  Thoracic extension and pectoral stretch  1. Roll a towel or a small blanket so it is about 4 inches (10 cm) in diameter.  2. Lie down on your back on a firm surface.  3. Put the towel lengthwise, under your spine in the middle of your back. It should not be under your shoulder blades. The towel should line up with your spine from your middle back to your lower back.  4. Put your hands behind your head and let your elbows fall out to your sides.  5. Hold for __________ seconds.  Repeat __________ times. Complete this exercise __________ times a day.  Strengthening exercises  Isometric upper cervical flexion  1. Lie on your back with a thin pillow behind your head and a small rolled-up towel under your neck.  2. Gently tuck your chin toward your chest and nod your head down to look toward your feet. Do not lift your head off the pillow.  3. Hold for __________ seconds.  4. Release the tension slowly. Relax your neck muscles completely before you repeat this exercise.  Repeat __________ times. Complete this exercise __________ times a day.  Isometric cervical extension    1. Stand about 6 inches (15 cm) away from a wall, with your back facing the wall.  2. Place a soft object, about 6-8 inches (15-20 cm) in diameter, between the back of your head and the wall. A soft object could be a small pillow, a ball, or a folded  towel.  3. Gently tilt your head back and press into the soft object. Keep your jaw and forehead relaxed.  4. Hold for __________ seconds.  5. Release the tension slowly. Relax your neck muscles completely before you repeat this exercise.  Repeat __________ times. Complete this exercise __________ times a day.  Posture and body mechanics  Body mechanics refers to the movements and positions of your body while you do your daily activities. Posture is part of body mechanics. Good posture and healthy body mechanics can help to relieve stress in your body's tissues and joints. Good posture means that your spine is in its natural S-curve position (your spine is neutral), your shoulders are pulled back slightly, and your head is not tipped forward. The following are general guidelines for applying improved posture and body mechanics to your everyday activities.  Sitting    1. When sitting, keep your spine neutral and keep your feet flat on the floor. Use a footrest, if necessary, and keep your thighs parallel to the floor. Avoid rounding your shoulders, and avoid tilting your head forward.  2. When working at a desk or a computer, keep your desk at a height where your hands are slightly lower than your elbows. Slide your chair under your desk so you are close enough to maintain good posture.  3. When working at a computer, place your monitor at a height where you are looking straight ahead and you do not have to tilt your head forward or downward to look at the screen.  Standing    · When standing, keep your spine neutral and keep your feet about hip-width apart. Keep a slight bend in your knees. Your ears, shoulders, and hips should line up.  · When you do a task in which you  one place for a long time, place one foot up on a stable object that is 2-4 inches (5-10 cm) high, such as a footstool. This helps keep your spine neutral.  Resting  When lying down and resting, avoid positions that are most painful for  you. Try to support your neck in a neutral position. You can use a contour pillow or a small rolled-up towel. Your pillow should support your neck but not push on it.  This information is not intended to replace advice given to you by your health care provider. Make sure you discuss any questions you have with your health care provider.  Document Released: 12/18/2006 Document Revised: 04/08/2020 Document Reviewed: 09/18/2019  [employer] Patient Education © 2020 [employer]

## 2020-08-28 NOTE — PATIENT INSTRUCTIONS
Discharge instructions  Stretching  Neck exercises to increase muscle tone for better support of the spine and hopefully help alleviate pressure as well as with the NSAIDs are for  Physical therapy most days of the week  Change ergonomics at work    Medrol Dosepak now  Followed by naproxen 500 mg twice daily with food and water for 3 to 6 weeks  [employer] review this website wonderful website with exercises as well as videoed demonstrations    Chest pain shortness of breath nausea vomiting diaphoresis  Extremity weakness  Bowel or bladder retention incontinence  Gait abnormality lower extremity weakness weakness in the hand emergency room    Cervical Radiculopathy    Cervical radiculopathy happens when a nerve in the neck (a cervical nerve) is pinched or bruised. This condition can happen because of an injury to the cervical spine (vertebrae) in the neck, or as part of the normal aging process. Pressure on the cervical nerves can cause pain or numbness that travels from the neck all the way down into the arm and fingers. Usually, this condition gets better with rest. Treatment may be needed if the condition does not improve.  What are the causes?  This condition may be caused by:  · A neck injury.  · A bulging (herniated) disk.  · Muscle spasms.  · Muscle tightness in the neck because of overuse.  · Arthritis.  · Breakdown or degeneration in the bones and joints of the spine (spondylosis) due to aging.  · Bone spurs that may develop near the cervical nerves.  What are the signs or symptoms?  Symptoms of this condition include:  · Pain. The pain may travel from the neck to the arm and hand. The pain can be severe or irritating. It may be worse when you move your neck.  · Numbness or tingling in your arm or hand.  · Weakness in the affected arm and hand, in severe cases.  How is this diagnosed?  This condition may be diagnosed based on your symptoms, your medical history, and a physical exam. You may also have  tests, including:  · X-rays.  · A CT scan.  · An MRI.  · An electromyogram (EMG).  · Nerve conduction tests.  How is this treated?  In many cases, treatment is not needed for this condition. With rest, the condition usually gets better over time. If treatment is needed, options may include:  · Wearing a soft neck collar (cervical collar) for short periods of time, as told by your health care provider.  · Doing physical therapy to strengthen your neck muscles.  · Taking medicines, such as NSAIDs or oral corticosteroids.  · Having spinal injections, in severe cases.  · Having surgery. This may be needed if other treatments do not help. Different types of surgery may be done depending on the cause of this condition.  Follow these instructions at home:  If you have a cervical collar:  · Wear it as told by your health care provider. Remove it only as told by your health care provider.  · Ask your health care provider if you can remove the collar for cleaning and bathing. If you are allowed to remove the collar for cleaning or bathing:  ? Follow instructions from your health care provider about how to remove the collar safely.  ? Clean the collar by wiping it with mild soap and water and drying it completely.  ? Take out any removable pads in the collar every 1-2 days, and wash them by hand with soap and water. Let them air-dry completely before you put them back in the collar.  ? Check your skin under the collar for irritation or sores. If you see any, tell your health care provider.  Managing pain         · Take over-the-counter and prescription medicines only as told by your health care provider.  · If directed, put ice on the affected area.  ? If you have a soft neck collar, remove it as told by your health care provider.  ? Put ice in a plastic bag.  ? Place a towel between your skin and the bag.  ? Leave the ice on for 20 minutes, 2-3 times a day.  · If applying ice does not help, you can try using heat. Use the  heat source that your health care provider recommends, such as a moist heat pack or a heating pad.  ? Place a towel between your skin and the heat source.  ? Leave the heat on for 20-30 minutes.  ? Remove the heat if your skin turns bright red. This is especially important if you are unable to feel pain, heat, or cold. You may have a greater risk of getting burned.  · Try a gentle neck and shoulder massage to help relieve symptoms.  Activity  · Rest as needed.  · Return to your normal activities as told by your health care provider. Ask your health care provider what activities are safe for you.  · Do stretching and strengthening exercises as told by your health care provider or physical therapist.  · Do not lift anything that is heavier than 10 lb (4.5 kg) until your health care provider tells you that it is safe.  General instructions  · Use a flat pillow when you sleep.  · Do not drive while wearing a cervical collar. If you do not have a cervical collar, ask your health care provider if it is safe to drive while your neck heals.  · Ask your health care provider if the medicine prescribed to you requires you to avoid driving or using heavy machinery.  · Do not use any products that contain nicotine or tobacco, such as cigarettes, e-cigarettes, and chewing tobacco. These can delay healing. If you need help quitting, ask your health care provider.  · Keep all follow-up visits as told by your health care provider. This is important.  Contact a health care provider if:  · Your condition does not improve with treatment.  Get help right away if:  · Your pain gets much worse and cannot be controlled with medicines.  · You have weakness or numbness in your hand, arm, face, or leg.  · You have a high fever.  · You have a stiff, rigid neck.  · You lose control of your bowels or your bladder (have incontinence).  · You have trouble with walking, balance, or speaking.  Summary  · Cervical radiculopathy happens when a nerve  in the neck is pinched or bruised.  · A nerve can get pinched from a bulging disk, arthritis, muscle spasms, or an injury to the neck.  · Symptoms include pain, tingling, or numbness radiating from the neck into the arm or hand. Weakness can also occur in severe cases.  · Treatment may include rest, wearing a cervical collar, and physical therapy. Medicines may be prescribed to help with pain. In severe cases, injections or surgery may be needed.  This information is not intended to replace advice given to you by your health care provider. Make sure you discuss any questions you have with your health care provider.  Document Released: 09/12/2002 Document Revised: 11/08/2019 Document Reviewed: 11/08/2019  [employer] Patient Education © 2020 [employer]    Cervical Strain and Sprain Rehab  Ask your health care provider which exercises are safe for you. Do exercises exactly as told by your health care provider and adjust them as directed. It is normal to feel mild stretching, pulling, tightness, or discomfort as you do these exercises. Stop right away if you feel sudden pain or your pain gets worse. Do not begin these exercises until told by your health care provider.  Stretching and range-of-motion exercises  Cervical side bending    1. Using good posture, sit on a stable chair or stand up.  2. Without moving your shoulders, slowly tilt your left / right ear to your shoulder until you feel a stretch in the opposite side neck muscles. You should be looking straight ahead.  3. Hold for __________ seconds.  4. Repeat with the other side of your neck.  Repeat __________ times. Complete this exercise __________ times a day.  Cervical rotation    1. Using good posture, sit on a stable chair or stand up.  2. Slowly turn your head to the side as if you are looking over your left / right shoulder.  ? Keep your eyes level with the ground.  ? Stop when you feel a stretch along the side and the back of your neck.  3. Hold for  __________ seconds.  4. Repeat this by turning to your other side.  Repeat __________ times. Complete this exercise __________ times a day.  Thoracic extension and pectoral stretch  1. Roll a towel or a small blanket so it is about 4 inches (10 cm) in diameter.  2. Lie down on your back on a firm surface.  3. Put the towel lengthwise, under your spine in the middle of your back. It should not be under your shoulder blades. The towel should line up with your spine from your middle back to your lower back.  4. Put your hands behind your head and let your elbows fall out to your sides.  5. Hold for __________ seconds.  Repeat __________ times. Complete this exercise __________ times a day.  Strengthening exercises  Isometric upper cervical flexion  1. Lie on your back with a thin pillow behind your head and a small rolled-up towel under your neck.  2. Gently tuck your chin toward your chest and nod your head down to look toward your feet. Do not lift your head off the pillow.  3. Hold for __________ seconds.  4. Release the tension slowly. Relax your neck muscles completely before you repeat this exercise.  Repeat __________ times. Complete this exercise __________ times a day.  Isometric cervical extension    1. Stand about 6 inches (15 cm) away from a wall, with your back facing the wall.  2. Place a soft object, about 6-8 inches (15-20 cm) in diameter, between the back of your head and the wall. A soft object could be a small pillow, a ball, or a folded towel.  3. Gently tilt your head back and press into the soft object. Keep your jaw and forehead relaxed.  4. Hold for __________ seconds.  5. Release the tension slowly. Relax your neck muscles completely before you repeat this exercise.  Repeat __________ times. Complete this exercise __________ times a day.  Posture and body mechanics  Body mechanics refers to the movements and positions of your body while you do your daily activities. Posture is part of body  mechanics. Good posture and healthy body mechanics can help to relieve stress in your body's tissues and joints. Good posture means that your spine is in its natural S-curve position (your spine is neutral), your shoulders are pulled back slightly, and your head is not tipped forward. The following are general guidelines for applying improved posture and body mechanics to your everyday activities.  Sitting    1. When sitting, keep your spine neutral and keep your feet flat on the floor. Use a footrest, if necessary, and keep your thighs parallel to the floor. Avoid rounding your shoulders, and avoid tilting your head forward.  2. When working at a desk or a computer, keep your desk at a height where your hands are slightly lower than your elbows. Slide your chair under your desk so you are close enough to maintain good posture.  3. When working at a computer, place your monitor at a height where you are looking straight ahead and you do not have to tilt your head forward or downward to look at the screen.  Standing    · When standing, keep your spine neutral and keep your feet about hip-width apart. Keep a slight bend in your knees. Your ears, shoulders, and hips should line up.  · When you do a task in which you  one place for a long time, place one foot up on a stable object that is 2-4 inches (5-10 cm) high, such as a footstool. This helps keep your spine neutral.  Resting  When lying down and resting, avoid positions that are most painful for you. Try to support your neck in a neutral position. You can use a contour pillow or a small rolled-up towel. Your pillow should support your neck but not push on it.  This information is not intended to replace advice given to you by your health care provider. Make sure you discuss any questions you have with your health care provider.  Document Released: 12/18/2006 Document Revised: 04/08/2020 Document Reviewed: 09/18/2019  Elsevier Patient Education © 2020  [employer]

## 2020-09-18 ENCOUNTER — TREATMENT (OUTPATIENT)
Dept: PHYSICAL THERAPY | Facility: CLINIC | Age: 35
End: 2020-09-18

## 2020-09-18 DIAGNOSIS — M54.2 PAIN, NECK: Primary | ICD-10-CM

## 2020-09-18 DIAGNOSIS — M54.12 RADICULOPATHY, CERVICAL: ICD-10-CM

## 2020-09-18 PROCEDURE — 97140 MANUAL THERAPY 1/> REGIONS: CPT | Performed by: PHYSICAL THERAPIST

## 2020-09-18 PROCEDURE — 97012 MECHANICAL TRACTION THERAPY: CPT | Performed by: PHYSICAL THERAPIST

## 2020-09-18 PROCEDURE — 97110 THERAPEUTIC EXERCISES: CPT | Performed by: PHYSICAL THERAPIST

## 2020-09-18 PROCEDURE — 97161 PT EVAL LOW COMPLEX 20 MIN: CPT | Performed by: PHYSICAL THERAPIST

## 2020-09-18 NOTE — PATIENT INSTRUCTIONS
Access Code: [email or web address]   URL: [email or web address]   Date: 09/18/2020   Prepared by: Spencer Hicks     Exercises  Supine Cervical Retraction with Towel - 10 reps - 2 sets - 3 hold - 2x daily - 7x weekly  Cervical Retraction at Wall - 10 reps - 2 sets - 3 hold - 2x daily - 7x weekly  Hooklying Neck Distraction and Traction - 10 reps - 10 sets - 2x daily - 7x weekly

## 2020-09-18 NOTE — PROGRESS NOTES
Physical Therapy Initial Evaluation and Plan of Care      Patient: Paul West   : 1985  Diagnosis/ICD-10 Code:  Pain, neck [M54.2]  Referring practitioner: James Epley, APRN  Date of Initial Visit: 2020  Today's Date: 2020          Subjective Evaluation    History of Present Illness  Mechanism of injury: Patient reports that he has been having L arm pain now for ~ 3 months with IO. Notes that he has seen his MD for this who referred him to PT. He was given a steroid Rx that has helped. He has pain in the L arm with left rotation of the neck. Here for evaluation and treatment of neck/ arm pain. Patient has been given HEP from MD.     Pain  Current pain rating: 3  At worst pain ratin  Location: L arm   Quality: dull ache and radiating  Relieving factors: relaxation and rest  Aggravating factors: movement, repetitive movement, overhead activity and lifting    Diagnostic Tests  No diagnostic tests performed    Treatments  Previous treatment: medication  Patient Goals  Patient goals for therapy: decreased pain, increased motion, increased strength, independence with ADLs/IADLs and return to sport/leisure activities         NDI 8/50     Objective        Special Questions      Additional Special Questions  Patient notes diminished sensation at L index finger only       Postural Observations  Seated posture: good  Standing posture: good        Palpation   Left   Hypertonic in the cervical paraspinals and upper trapezius.   Tenderness of the cervical paraspinals and upper trapezius.     Right   Hypertonic in the cervical paraspinals. Tenderness of the cervical paraspinals.     Additional Palpation Details  TTP and hypertonic at L RTC (external rotators)       Tenderness   Cervical Spine   Tenderness in the facet joint (C3/4 cervical facets TTP) and left 1st rib.     Neurological Testing     Sensation   Cervical/Thoracic   Left   Intact: light touch    Right   Intact: light touch    Active Range  of Motion   Cervical/Thoracic Spine   Cervical    Left lateral flexion: WFL  Right lateral flexion: WFL  Left rotation: Neck active rotation left: 75% with pain  Right rotation: WFL    Thoracic   Extension: Active thoracic extension: 75% deg. with pain    Strength/Myotome Testing     Left Wrist/Hand   Wrist flexion: 4    Right Wrist/Hand   Wrist flexion: 4+    Additional Strength Details  UE strength WFL unless noted above  RUE  78 lbs  LUE  82 lbs      Tests   Cervical     Left   Positive Spurling's sign.     Additional Tests Details  + left quadrant             Assessment & Plan     Assessment  Impairments: abnormal muscle tone, abnormal or restricted ROM, activity intolerance, impaired physical strength, lacks appropriate home exercise program and pain with function  Assessment details: Patient presents with neck and LUE pain that has been persistent for ~3 months. He has signs and symptoms of cervical radiculopathy. He has neck/ arm pian with normal activities,  low activity tolerance, and lacks home symptom management program.  Pt would benefit from skilled PT services in order to address listed impairments and increase tolerance to normal daily activities including ADLs, work and recreational activities.    Prognosis: good  Prognosis details: GOALS  Short Term Goals ( 3 weeks)  1. Pt to be independent with HEP  2. Pt to exhibit increased cervical segmental mobility to allow for increased AROM  3. Patient will report > 50% reduction in L arm symptoms  4. Pt to report decreased pain with ADL's to < 4/10     Long Term Goals (  6 weeks)  1. Pt to exhibit > 75 deg c-spine rotation (B) to allow for viewing traffic without pain or limitations  2. Patient will tolerate full AROM LR at cervical spine without pain  3. Pt improve Oswestry score to </= 2/50 showing significant change.   4. Pt able to perform ADL's and recreational activities with reduced pain  5. Patient will improve LUE symptoms > 75 % from onset  of PT.   Functional Limitations: carrying objects, lifting, sleeping, pulling, pushing, reaching behind back, reaching overhead and unable to perform repetitive tasks  Plan  Therapy options: will be seen for skilled physical therapy services  Planned modality interventions: cryotherapy, ultrasound, traction, TENS, thermotherapy (hydrocollator packs), high voltage pulsed current (pain management) and electrical stimulation/Russian stimulation  Planned therapy interventions: joint mobilization, IADL retraining, home exercise program, functional ROM exercises, flexibility, body mechanics training, ADL retraining, abdominal trunk stabilization, manual therapy, motor coordination training, neuromuscular re-education, stretching, strengthening, spinal/joint mobilization, therapeutic activities, postural training and soft tissue mobilization  Frequency: 2x week  Duration in visits: 12  Treatment plan discussed with: patient        Manual Therapy:    9     mins  83009;  Therapeutic Exercise:    10     mins  30164;     Neuromuscular Cal:        mins  87720;    Therapeutic Activity:          mins  07166;     Gait Training:           mins  50942;     Ultrasound:          mins  88408;    Electrical Stimulation:         mins  32809 ( );  Dry Needling          mins self-pay  12 min traction (cervical)     Timed Treatment:   19   mins   Total Treatment:     55   mins    PT SIGNATURE: Spencer Hicks PT DPT   [place] License # [id number]  DATE TREATMENT INITIATED: 9/18/2020    Initial Certification  Certification Period: 12/17/2020  I certify that the therapy services are furnished while this patient is under my care.  The services outlined above are required by this patient, and will be reviewed every 90 days.     PHYSICIAN: Epley, James, APRN   ________________________________     DATE: ______________    Please sign and return via fax to [phone].. Thank you, [facility].

## 2020-09-24 ENCOUNTER — TREATMENT (OUTPATIENT)
Dept: PHYSICAL THERAPY | Facility: CLINIC | Age: 35
End: 2020-09-24

## 2020-09-24 DIAGNOSIS — M54.12 RADICULOPATHY, CERVICAL: ICD-10-CM

## 2020-09-24 DIAGNOSIS — M54.2 PAIN, NECK: Primary | ICD-10-CM

## 2020-09-24 PROCEDURE — 97140 MANUAL THERAPY 1/> REGIONS: CPT | Performed by: PHYSICAL THERAPIST

## 2020-09-24 PROCEDURE — 97012 MECHANICAL TRACTION THERAPY: CPT | Performed by: PHYSICAL THERAPIST

## 2020-09-24 PROCEDURE — 97110 THERAPEUTIC EXERCISES: CPT | Performed by: PHYSICAL THERAPIST

## 2020-09-24 NOTE — PROGRESS NOTES
Physical Therapy Daily Progress Note  2 treatments  Subjective     Paul West reports: he has continued to have LUE radicular symptoms over the past week. Notes that his symptoms are primarily in the posterior shoulder and upper arm. He has a home traction unit and does feel better after using it as well as after doing his HEP.         Objective   See Exercise, Manual, and Modality Logs for complete treatment.       Assessment/Plan     Patient tolerated all treatment well. Added nerve glides to program today but not formally to HEP until patient's response is determined.  Patient continues to need skilled therapy to improve neck and arm symptoms.  Patient is progressing well at this time. Will continue to advance POC as tolerated.     Progress per Plan of Care and Progress strengthening /stabilization /functional activity           Manual Therapy:    16     mins  77567;  Therapeutic Exercise:    12     mins  85103;     Neuromuscular Cal:        mins  42490;    Therapeutic Activity:          mins  43037;     Gait Training:           mins  02974;     Ultrasound:          mins  09448;    Electrical Stimulation:         mins  71759 ( );  Dry Needling          mins self-pay  15 min cervical traction    Timed Treatment:   28   mins   Total Treatment:     43   mins    Spencer Hicks PT DPT  Physical Therapist  [place] License # [id number]

## 2020-10-02 ENCOUNTER — TREATMENT (OUTPATIENT)
Dept: PHYSICAL THERAPY | Facility: CLINIC | Age: 35
End: 2020-10-02

## 2020-10-02 PROCEDURE — 97140 MANUAL THERAPY 1/> REGIONS: CPT | Performed by: PHYSICAL THERAPIST

## 2020-10-02 PROCEDURE — 97012 MECHANICAL TRACTION THERAPY: CPT | Performed by: PHYSICAL THERAPIST

## 2020-10-02 PROCEDURE — 97110 THERAPEUTIC EXERCISES: CPT | Performed by: PHYSICAL THERAPIST

## 2020-10-02 NOTE — PROGRESS NOTES
Physical Therapy Daily Progress Note  3 treatments  Subjective     Paul West reports: his hiking trip went well. Notes that he has continued to have symptoms in his L arm but that they are less severe.         Objective   See Exercise, Manual, and Modality Logs for complete treatment.       Assessment/Plan  Patient showing mild response to treatment on this date. Traction continues to improve symptoms. ULNT was improved with some manual techniques at C-spine. Manipulation was tolerated well but did not improve AROM or pain symptoms of cervical spine. Patient was not given updates to HEP and was told to closely monitor symptoms until the next treatment at which HEP updates will be made.   Progress per Plan of Care and Progress strengthening /stabilization /functional activity           Manual Therapy:    18    mins  45800;  Therapeutic Exercise:    14     mins  15331;     Neuromuscular Cal:        mins  18324;    Therapeutic Activity:          mins  27776;     Gait Training:           mins  73419;     Ultrasound:          mins  60543;    Electrical Stimulation:         mins  75830 ( );  Dry Needling          mins self-pay  15 min cervical traction  Timed Treatment:   32   mins   Total Treatment:     47   mins    Spencer Hicks PT DPT  Physical Therapist  [place] License # [id number]

## 2020-10-06 ENCOUNTER — TELEPHONE (OUTPATIENT)
Dept: FAMILY MEDICINE CLINIC | Facility: CLINIC | Age: 35
End: 2020-10-06

## 2020-10-06 ENCOUNTER — TREATMENT (OUTPATIENT)
Dept: PHYSICAL THERAPY | Facility: CLINIC | Age: 35
End: 2020-10-06

## 2020-10-06 DIAGNOSIS — M54.2 PAIN, NECK: Primary | ICD-10-CM

## 2020-10-06 DIAGNOSIS — M54.12 RADICULOPATHY, CERVICAL: ICD-10-CM

## 2020-10-06 PROCEDURE — 97110 THERAPEUTIC EXERCISES: CPT | Performed by: PHYSICAL THERAPIST

## 2020-10-06 PROCEDURE — 97012 MECHANICAL TRACTION THERAPY: CPT | Performed by: PHYSICAL THERAPIST

## 2020-10-06 PROCEDURE — 97140 MANUAL THERAPY 1/> REGIONS: CPT | Performed by: PHYSICAL THERAPIST

## 2020-10-06 NOTE — PROGRESS NOTES
Physical Therapy Daily Progress Note  4 treatments  Subjective     Paul West reports: he has noticed some increased tingling in his arm since the last visit. He was unable to do a push up this past week. Patient notes that he has now ended his Rx of naproxen and prednisone. He notes that the areas of aching/ tingling have changed some over the past several days. Notes that he has some aching in his armpit/ pec on the L side.         Objective   See Exercise, Manual, and Modality Logs for complete treatment.   Left cervical rotation continues to be limited and cause discomfort at L side cervical.   Cervical extension continues to be limited and cause discomfort at L side cervical  Paresthesias began to increase in LUE with repeated cervical motions testing.   L triceps 4/5   R triceps WFL    Assessment/Plan   Patient tolerated treatment well. Although symptoms in L arm were able to be regressed during treatment there was no acute change to L elbow extension MMT. At this time while patients symptoms of pain/ aching/ paresthesias are predictable his new onset of LUE weakness is concerning and PT would recommend that this patient have cervical MRI to better examine possible sites of nerve root compression/ irritation.       Awaiting MD orders           Manual Therapy:    15     mins  62587;  Therapeutic Exercise:    12     mins  45074;     Neuromuscular Cal:        mins  44579;    Therapeutic Activity:          mins  78474;     Gait Training:           mins  39654;     Ultrasound:          mins  49184;    Electrical Stimulation:         mins  76832 ( );  Dry Needling          mins self-pay  15 min cervical traction    Timed Treatment:   27   mins   Total Treatment:     42   mins    Spencer Hicks PT DPT  Physical Therapist  [place] License # [id number]

## 2020-10-09 ENCOUNTER — OFFICE VISIT (OUTPATIENT)
Dept: FAMILY MEDICINE CLINIC | Facility: CLINIC | Age: 35
End: 2020-10-09

## 2020-10-09 VITALS
WEIGHT: 202 LBS | TEMPERATURE: 97.1 F | SYSTOLIC BLOOD PRESSURE: 126 MMHG | OXYGEN SATURATION: 97 % | HEART RATE: 62 BPM | DIASTOLIC BLOOD PRESSURE: 77 MMHG | BODY MASS INDEX: 25.12 KG/M2 | HEIGHT: 75 IN

## 2020-10-09 DIAGNOSIS — R29.898 WEAKNESS OF LEFT UPPER EXTREMITY: ICD-10-CM

## 2020-10-09 DIAGNOSIS — M54.16 LUMBAR RADICULOPATHY, ACUTE: Primary | ICD-10-CM

## 2020-10-09 PROCEDURE — 99213 OFFICE O/P EST LOW 20 MIN: CPT | Performed by: NURSE PRACTITIONER

## 2020-10-09 RX ORDER — EPINEPHRINE 0.3 MG/.3ML
INJECTION SUBCUTANEOUS
COMMUNITY
Start: 2020-09-01

## 2020-10-09 RX ORDER — METHYLPREDNISOLONE 4 MG/1
TABLET ORAL
Qty: 21 TABLET | Refills: 0 | Status: SHIPPED | OUTPATIENT
Start: 2020-10-09 | End: 2021-09-19

## 2020-10-09 NOTE — PROGRESS NOTES
"Subjective   Paul West is a 34 y.o. male.     Pleasant patient here follow-up cervical radiculopathy he complains of left achiness and pain left trapezoid as well as paresthesias that radiate down to his hand all 5 fingers.  Patient was seen 6 weeks ago at that time he was given a Medrol Dosepak for inflammation and followed by at least 5 weeks of naproxen 500 twice daily.  Initially had some improvement with the Medrol Dosepak but the pain returned  And actually a little worse pain presently he has been exercising and stretching his neck daily he is even had cervical traction at home and with therapy  Physical therapist I spoke to him a couple days ago Spencer and he is concerned about patient not improving as well as some mild to moderate weakness on his left trapezoid.  And recommends a MRI to rule out disc herniation or other compression on his nerve root.  Patient has no lower extremity weakness no bowel or bladder incontinence or retention no saddle-like paresthesias.  He has no fevers.  He has no history of risk factors for osteomyelitis.  Patient does as well perceived some weakness in his left arm he has some difficulty at times holding his infant  No known injury.       /77   Pulse 62   Temp 97.1 °F (36.2 °C) (Temporal)   Ht 190.5 cm (75\")   Wt 91.6 kg (202 lb)   SpO2 97%   BMI 25.25 kg/m²       The following portions of the patient's history were reviewed and updated as appropriate: allergies, current medications, past family history, past medical history, past social history, past surgical history and problem list.    Review of Systems   Constitutional: Negative for fatigue and fever.   HENT: Negative.  Negative for trouble swallowing.    Eyes: Negative.    Respiratory: Negative.  Negative for cough and shortness of breath.    Cardiovascular: Negative for chest pain, palpitations and leg swelling.   Gastrointestinal: Negative.  Negative for abdominal pain.   Genitourinary: Negative.  "   Musculoskeletal: Negative.    Skin: Negative.    Neurological: Negative.  Negative for dizziness and confusion.   Psychiatric/Behavioral: Negative.        Objective   Physical Exam  Vitals signs reviewed.   Constitutional:       Appearance: He is well-developed.   HENT:      Head: Normocephalic and atraumatic.   Eyes:      Conjunctiva/sclera: Conjunctivae normal.      Pupils: Pupils are equal, round, and reactive to light.   Neck:      Musculoskeletal: Neck supple.   Pulmonary:      Effort: Pulmonary effort is normal.   Musculoskeletal:      Comments: Full range of motion neck head, increased pain moderate the patient rotates head to the left.  Tricipital weakness noted mild to moderate left compared to right  Physical exam, left appears to be less defined muscularly than right, implying at least some atrophy could be taking place.  Reflexes deep tendon  Right 2+ left 1+     Skin:     General: Skin is warm and dry.      Findings: No erythema or rash.   Neurological:      General: No focal deficit present.      Mental Status: He is oriented to person, place, and time.      Cranial Nerves: No cranial nerve deficit.      Sensory: No sensory deficit.      Motor: Weakness present.      Coordination: Coordination normal.      Gait: Gait normal.      Deep Tendon Reflexes: Reflexes normal.   Psychiatric:         Mood and Affect: Mood normal.         Behavior: Behavior normal.         Thought Content: Thought content normal.         Judgment: Judgment normal.           Assessment/Plan   Paul was seen today for cervical radiculopathy.    Diagnoses and all orders for this visit:    Lumbar radiculopathy, acute    Weakness of left upper extremity  Comments:  Mild to moderate left tricep weakness and subtle atrophy      Patient's physical therapist noticed clinically significant weakness left tricep region  Reproduced today and on exam he continues to have pain and not helping so far with physical therapy looks like we need  to get a MRI to rule out cervical disc herniation or other  Pathology to account for patient's pain and weakness  Who continue exercises presently  If gait difficulties bowel bladder incontinence retention  Increased or severe weakness emergency room  May continue anti-inflammatories for now          There are no Patient Instructions on file for this visit.

## 2020-10-09 NOTE — PATIENT INSTRUCTIONS
Discharge instructions  Continue physical therapy  Medrol Dosepak now hold naproxen then resume naproxen  At least for the next 2 to 3 weeks as long as you tolerate this well  Outpatient MRI cervical  If weakness bowel or bladder retention or incontinence gait difficulties lower extremity weakness  Worsening symptoms emergency room  Call for results of MRI 1 to 2 days later  Will likely need a referral to neurosurgery

## 2020-10-13 ENCOUNTER — TREATMENT (OUTPATIENT)
Dept: PHYSICAL THERAPY | Facility: CLINIC | Age: 35
End: 2020-10-13

## 2020-10-13 PROCEDURE — 97140 MANUAL THERAPY 1/> REGIONS: CPT | Performed by: PHYSICAL THERAPIST

## 2020-10-13 PROCEDURE — 97014 ELECTRIC STIMULATION THERAPY: CPT | Performed by: PHYSICAL THERAPIST

## 2020-10-13 NOTE — PROGRESS NOTES
Physical Therapy Daily Progress Note  5 treatments  Subjective     Paul West reports: He has felt gradual improvement in his LUE symptoms over the weekend. Notes that he went to see Dr. Epley last Friday and got an MRI ordered. Patient will be scheduling this as soon as possible. He has also been prescribed another prednisone dose pack and Naproxen. Patient has not started these yet but does have them filled.       SANE 30%   PASS: NO    Objective   See Exercise, Manual, and Modality Logs for complete treatment.   Pain with LR reduced with elevation of L shoulder girdle indicating nerve vs soft tissue contribution.   Soft tissue was assessed at [facility]. PT noted point tenderness as well as palpable trigger points within the muslce tissue. On this date patient stated that they would like to undergo a dry needling procedure for the soft tissue dysfunction. Patient was educated on the procedure for dry needling and consent waver was signed. Patient was informed of the risks, possible adverse effects, along with the benefits of TDN.       Assessment/Plan     Patient tolerated all treatment well. LTR was seen in all targeted muscle groups. Patient was educated on techniques to reduce pain/ soreness if needed as well as what symptoms can be expected following this procedure. Will continue to follow this patient and treat as needed.        Progress per Plan of Care            Manual Therapy:    24     mins  99771;  Therapeutic Exercise:         mins  03670;     Neuromuscular Cal:        mins  61154;    Therapeutic Activity:          mins  30248;     Gait Training:           mins  47909;     Ultrasound:          mins  56059;    Electrical Stimulation:   15      mins  04721 ( );  Dry Needling     trial     mins self-pay    Timed Treatment:   24   mins   Total Treatment:     39   mins    Spencer Hicks PT DPT  Physical Therapist  [place] License # [id number]

## 2020-10-20 ENCOUNTER — TREATMENT (OUTPATIENT)
Dept: PHYSICAL THERAPY | Facility: CLINIC | Age: 35
End: 2020-10-20

## 2020-10-20 DIAGNOSIS — M54.2 PAIN, NECK: Primary | ICD-10-CM

## 2020-10-20 DIAGNOSIS — M54.12 RADICULOPATHY, CERVICAL: ICD-10-CM

## 2020-10-20 PROCEDURE — 97140 MANUAL THERAPY 1/> REGIONS: CPT | Performed by: PHYSICAL THERAPIST

## 2020-10-20 PROCEDURE — 97014 ELECTRIC STIMULATION THERAPY: CPT | Performed by: PHYSICAL THERAPIST

## 2020-10-20 PROCEDURE — DRYNDL PR CUSTOM DRY NEEDLING SELF PAY: Performed by: PHYSICAL THERAPIST

## 2020-10-20 NOTE — PROGRESS NOTES
Physical Therapy Daily Progress Note  6 treatments  Subjective     Paul West reports: Patient notes that he has been feeling better over the weekend. Notes that he performed weight lifting over the weekend and while he did not lift heavy he had no pain with any of the patterns.         Objective   See Exercise, Manual, and Modality Logs for complete treatment.   Soft tissue was assessed at [facility] . PT noted point tenderness as well as palpable trigger points within the muslce tissue. On this date patient stated that they would like to undergo a dry needling procedure for the soft tissue dysfunction. Patient was educated on the procedure for dry needling and consent waver was signed. Patient was informed of the risks, possible adverse effects, along with the benefits of TDN.       Assessment/Plan     Patient tolerated all treatment well. LTR was seen in all targeted muscle groups. Patient was educated on techniques to reduce pain/ soreness if needed as well as what symptoms can be expected following this procedure. Will continue to follow this patient and treat as needed.        Progress per Plan of Care           Manual Therapy:    15     mins  30181;  Therapeutic Exercise:         mins  98235;     Neuromuscular Cal:        mins  90347;    Therapeutic Activity:          mins  61432;     Gait Training:           mins  34791;     Ultrasound:          mins  18894;    Electrical Stimulation:    15     mins  38703 ( );  Dry Needling     10     mins self-pay    Timed Treatment:   15   mins   Total Treatment:     40   mins    Spencer Hicks PT DPT  Physical Therapist  [place] License # [id number]

## 2020-10-21 ENCOUNTER — HOSPITAL ENCOUNTER (OUTPATIENT)
Dept: MRI IMAGING | Facility: HOSPITAL | Age: 35
Discharge: HOME | End: 2020-10-21
Admitting: NURSE PRACTITIONER

## 2020-10-21 PROCEDURE — 72141 MRI NECK SPINE W/O DYE: CPT

## 2020-10-23 ENCOUNTER — TREATMENT (OUTPATIENT)
Dept: PHYSICAL THERAPY | Facility: CLINIC | Age: 35
End: 2020-10-23

## 2020-10-23 DIAGNOSIS — M54.2 PAIN, NECK: Primary | ICD-10-CM

## 2020-10-23 DIAGNOSIS — M54.12 RADICULOPATHY, CERVICAL: ICD-10-CM

## 2020-10-23 PROCEDURE — 97012 MECHANICAL TRACTION THERAPY: CPT | Performed by: PHYSICAL THERAPIST

## 2020-10-23 PROCEDURE — 97140 MANUAL THERAPY 1/> REGIONS: CPT | Performed by: PHYSICAL THERAPIST

## 2020-10-23 PROCEDURE — 97110 THERAPEUTIC EXERCISES: CPT | Performed by: PHYSICAL THERAPIST

## 2020-10-23 NOTE — PROGRESS NOTES
Physical Therapy Daily Progress Note  7 treatments  Subjective     Paul West reports: patient reports that he has continued to feel slight improvement in his pain symptoms. Notes that his arm weakness is still present but he feels he might be getting slight improvement on that as well.         Objective   See Exercise, Manual, and Modality Logs for complete treatment.     Reviewed MRI: osteophyte complexes at the C5-6 and C6-7 levels resulting in mild  to moderate degrees of central canal stenosis; Multilevel foraminal narrowing is identified within the cervical spine and this includes a mild-to-moderate degree of left C5-6, a mild degree  of right C5-6, and a moderate degree of bilateral C6-7 foraminal  narrowing secondary to predominantly uncovertebral joint hypertrophy       strength:   R side 75lbs  L side 65 lbs      Assessment/Plan       Patient tolerated all treatment well. Worked with patient on functional patterns that would allow him to work on activation of weak arm muscles without aggravating his neck. No symptoms were created during any of the exercises. Patient was educated to call in to his MD and discuss results of MRI.       Progress per Plan of Care and Progress strengthening /stabilization /functional activity           Manual Therapy:    15     mins  53081;  Therapeutic Exercise:    12     mins  97255;     Neuromuscular Cal:        mins  75020;    Therapeutic Activity:          mins  46645;     Gait Training:           mins  16200;     Ultrasound:          mins  11673;    Electrical Stimulation:         mins  12066 ( );  Dry Needling          mins self-pay  Traction  15 min    Timed Treatment:   27   mins   Total Treatment:     42   mins    Spencer Hicks PT DPT  Physical Therapist  [place] License # [id number]

## 2020-10-26 DIAGNOSIS — M54.12 CERVICAL RADICULOPATHY: ICD-10-CM

## 2020-10-26 DIAGNOSIS — M48.02 SPINAL STENOSIS IN CERVICAL REGION: Primary | ICD-10-CM

## 2020-10-27 ENCOUNTER — TREATMENT (OUTPATIENT)
Dept: PHYSICAL THERAPY | Facility: CLINIC | Age: 35
End: 2020-10-27

## 2020-10-27 DIAGNOSIS — M54.2 PAIN, NECK: Primary | ICD-10-CM

## 2020-10-27 DIAGNOSIS — M54.12 RADICULOPATHY, CERVICAL: ICD-10-CM

## 2020-10-27 PROCEDURE — 97140 MANUAL THERAPY 1/> REGIONS: CPT | Performed by: PHYSICAL THERAPIST

## 2020-10-27 PROCEDURE — 97012 MECHANICAL TRACTION THERAPY: CPT | Performed by: PHYSICAL THERAPIST

## 2020-10-27 PROCEDURE — 97110 THERAPEUTIC EXERCISES: CPT | Performed by: PHYSICAL THERAPIST

## 2020-10-27 NOTE — PROGRESS NOTES
Physical Therapy Daily Progress Note  8 treatments  Subjective     Paul West reports: he has continued to feel better. Notes that he feels the medicine in combination with the home exercises and traction are beginning to make a difference.         Objective   See Exercise, Manual, and Modality Logs for complete treatment.     LUE triceps strength 4/5 to 4+/5 on this date (slightly improved from prior test)     Assessment/Plan  Patient tolerated all treatment well. We worked on developing a UE strengthening program today and indicators for him to use to assess progress and appropriate workload. This patient appears to be progressing better again at this time and LUE strength appears higher than at prior visits.     Progress per Plan of Care and Progress strengthening /stabilization /functional activity           Manual Therapy:    12     mins  92337;  Therapeutic Exercise:    18     mins  75996;     Neuromuscular Cal:        mins  66273;    Therapeutic Activity:          mins  14988;     Gait Training:           mins  44879;     Ultrasound:          mins  29669;    Electrical Stimulation:         mins  37552 ( );  Dry Needling          mins self-pay  15 min cervical traction  Timed Treatment:   30   mins   Total Treatment:     45   mins    Spencer Hicks PT DPT  Physical Therapist  [place] License # [id number]

## 2020-10-30 ENCOUNTER — TREATMENT (OUTPATIENT)
Dept: PHYSICAL THERAPY | Facility: CLINIC | Age: 35
End: 2020-10-30

## 2020-10-30 DIAGNOSIS — M54.2 PAIN, NECK: Primary | ICD-10-CM

## 2020-10-30 DIAGNOSIS — M54.12 RADICULOPATHY, CERVICAL: ICD-10-CM

## 2020-10-30 PROCEDURE — 97140 MANUAL THERAPY 1/> REGIONS: CPT | Performed by: PHYSICAL THERAPIST

## 2020-10-30 PROCEDURE — DRYNDL PR CUSTOM DRY NEEDLING SELF PAY: Performed by: PHYSICAL THERAPIST

## 2020-10-30 PROCEDURE — 97110 THERAPEUTIC EXERCISES: CPT | Performed by: PHYSICAL THERAPIST

## 2020-10-30 NOTE — PROGRESS NOTES
Physical Therapy Daily Progress Note  9 treatments  Subjective     Paul West reports: his is continuing to feel improvement in his neck and arm symptoms. Notes that he has continued with his HEP and feels that the arm is gradually getting stronger.         Objective   See Exercise, Manual, and Modality Logs for complete treatment.     Soft tissue was assessed at [facility]. PT noted point tenderness as well as palpable trigger points within the muslce tissue. On this date patient stated that they would like to undergo a dry needling procedure for the soft tissue dysfunction. Patient was educated on the procedure for dry needling and consent waver was signed. Patient was informed of the risks, possible adverse effects, along with the benefits of TDN.     Assessment/Plan        Patient tolerated all treatment well. LTR was seen in all targeted muscle groups. Patient was educated on techniques to reduce pain/ soreness if needed as well as what symptoms can be expected following this procedure. Will continue to follow this patient and treat as needed.          Progress per Plan of Care and Progress strengthening /stabilization /functional activity           Manual Therapy:    24     mins  39803;  Therapeutic Exercise:    8     mins  03306;     Neuromuscular Cal:        mins  43214;    Therapeutic Activity:          mins  62701;     Gait Training:           mins  62085;     Ultrasound:          mins  41483;    Electrical Stimulation:         mins  25230 ( );  Dry Needling     10     mins self-pay  10 min heat post    Timed Treatment:   42   mins   Total Treatment:     52   mins    Spencer Hicks PT DPT  Physical Therapist  [place] License # [id number]

## 2020-11-06 ENCOUNTER — TREATMENT (OUTPATIENT)
Dept: PHYSICAL THERAPY | Facility: CLINIC | Age: 35
End: 2020-11-06

## 2020-11-06 DIAGNOSIS — M54.2 PAIN, NECK: Primary | ICD-10-CM

## 2020-11-06 DIAGNOSIS — M54.12 RADICULOPATHY, CERVICAL: ICD-10-CM

## 2020-11-06 PROCEDURE — 97530 THERAPEUTIC ACTIVITIES: CPT | Performed by: PHYSICAL THERAPIST

## 2020-11-06 PROCEDURE — 97140 MANUAL THERAPY 1/> REGIONS: CPT | Performed by: PHYSICAL THERAPIST

## 2020-11-06 PROCEDURE — 97110 THERAPEUTIC EXERCISES: CPT | Performed by: PHYSICAL THERAPIST

## 2020-11-06 NOTE — PROGRESS NOTES
Physical Therapy Reassesment Note  10 treatments  Subjective     Paul West reports: His neck and shoulder pain are improving. He has been consistent with his home strengthening routine and is feeling improved strength in his L arm.     NDI 5/50  Pain 1-2/10  SANE: 90%  PASS: YES  Objective   See Exercise, Manual, and Modality Logs for complete treatment.     Strength  L triceps 4+/5    AROM (cervical)     LR >80% with minimal symptoms in L shoulder  Extension > 80 % with mild aching in L arm       Assessment/Plan   L sided APs continue to improve symptoms better than other manual techniques thus far.   Patient tolerated all treatment well.  This patient has made good progress over the past 30 days of treatment. They have shown improvement in AROM, pain symptoms, and return of L UE strength. He continues to have radicular symptoms and was not able to get in with the neurosurgeon until February so he should continue to be managed by skilled PT to address remaining limitations and deficits.      Progress per Plan of Care and Progress strengthening /stabilization /functional activity           Manual Therapy:    14     mins  11322;  Therapeutic Exercise:    14     mins  36805;     Neuromuscular Cal:        mins  91357;    Therapeutic Activity:     10     mins  47315;     Gait Training:           mins  85277;     Ultrasound:          mins  77739;    Electrical Stimulation:         mins  32690 ( );  Dry Needling          mins self-pay    Timed Treatment:   38   mins   Total Treatment:     38   mins    Spencer Hicks PT DPT  Physical Therapist  [place] License # [id number]

## 2020-11-20 ENCOUNTER — TREATMENT (OUTPATIENT)
Dept: PHYSICAL THERAPY | Facility: CLINIC | Age: 35
End: 2020-11-20

## 2020-11-20 DIAGNOSIS — M54.12 RADICULOPATHY, CERVICAL: ICD-10-CM

## 2020-11-20 DIAGNOSIS — M54.2 PAIN, NECK: Primary | ICD-10-CM

## 2020-11-20 PROCEDURE — 97110 THERAPEUTIC EXERCISES: CPT | Performed by: PHYSICAL THERAPIST

## 2020-11-20 PROCEDURE — 97140 MANUAL THERAPY 1/> REGIONS: CPT | Performed by: PHYSICAL THERAPIST

## 2020-11-20 NOTE — PROGRESS NOTES
Physical Therapy Daily Progress Note        Subjective     Paul Parkgenevievejose cruz reports: Still doing well. Tingling almost all gone    Objective   Triceo test: 17.5# R; 12.5# L  See Exercise, Manual, and Modality Logs for complete treatment.       Assessment/Plan  Added single arm tricep kickback to HEP with 10# DB. Continue current HEP. Very minimal UE symptoms today    Progress per Plan of Care and Progress strengthening /stabilization /functional activity           Manual Therapy:  20       mins  00365;  Therapeutic Exercise: 10      mins  90393;     Neuromuscular Cal:       mins  16082;    Therapeutic Activity:         mins  63238;     Gait Training:         mins  55491;     Ultrasound:         mins  42358;    Electrical Stimulation:        mins  31988 ( );  Dry Needling         mins self-pay    Timed Treatment:   30   mins   Total Treatment:     30   mins    Bhavna Munoz PT  Physical Therapist  [place] License # [id number]

## 2020-12-07 ENCOUNTER — TREATMENT (OUTPATIENT)
Dept: PHYSICAL THERAPY | Facility: CLINIC | Age: 35
End: 2020-12-07

## 2020-12-07 DIAGNOSIS — M54.12 RADICULOPATHY, CERVICAL: ICD-10-CM

## 2020-12-07 DIAGNOSIS — M54.2 PAIN, NECK: Primary | ICD-10-CM

## 2020-12-07 PROCEDURE — 97140 MANUAL THERAPY 1/> REGIONS: CPT | Performed by: PHYSICAL THERAPIST

## 2020-12-07 PROCEDURE — 97110 THERAPEUTIC EXERCISES: CPT | Performed by: PHYSICAL THERAPIST

## 2020-12-07 NOTE — PROGRESS NOTES
Physical Therapy Daily Progress Note        Subjective     Paul West reports: Strength in arm coming back. No tingling for a few weeks    Objective   See Exercise, Manual, and Modality Logs for complete treatment.       Assessment/Plan  Pt able to do 5 full pushups without pain, just shaky and weak feeling B    Progress per Plan of Care           Manual Therapy:  20       mins  42866;  Therapeutic Exercise: 10      mins  18161;     Neuromuscular Cal:       mins  78002;    Therapeutic Activity:         mins  55513;     Gait Training:         mins  70800;     Ultrasound:         mins  78317;    Electrical Stimulation:        mins  59077 ( );  Dry Needling         mins self-pay    Timed Treatment:   30   mins   Total Treatment:     30   mins    Bhavna Munoz, PT  Physical Therapist  [place] License # [id number]

## 2020-12-14 ENCOUNTER — TREATMENT (OUTPATIENT)
Dept: PHYSICAL THERAPY | Facility: CLINIC | Age: 35
End: 2020-12-14

## 2020-12-14 DIAGNOSIS — M54.2 PAIN, NECK: Primary | ICD-10-CM

## 2020-12-14 DIAGNOSIS — M54.12 RADICULOPATHY, CERVICAL: ICD-10-CM

## 2020-12-14 PROCEDURE — 97140 MANUAL THERAPY 1/> REGIONS: CPT | Performed by: PHYSICAL THERAPIST

## 2020-12-14 PROCEDURE — 97110 THERAPEUTIC EXERCISES: CPT | Performed by: PHYSICAL THERAPIST

## 2020-12-15 NOTE — PROGRESS NOTES
Discharge Summary      Patient: Paul West   : 1985  Diagnosis/ICD-10 Code:  Pain, neck [M54.2]  Referring practitioner: James Epley, APRN  Date of Initial Visit: 2020  Today's Date: 12/15/2020  Patient seen for 13 sessions      Subjective:   Paul West reports: I am almost back to 100%. I can do pushups again, about 10-12 before I feel weak but that is both arms because I haven't been able to do them  Subjective Questionnaire: NDI:NT but 0% per pt report  Clinical Progress: improved  Home Program Compliance: Yes  Treatment has included: therapeutic exercise, neuromuscular re-education, manual therapy and traction    Objective   Cervical AROM/PROM 100% all planes w/o pain  (-) Spurlings  UE strength 5/5 including triceps L, wrist flex and extension    Assessment/Plan   Long Term Goals (  6 weeks)  1. Pt to exhibit > 75 deg c-spine rotation (B) to allow for viewing traffic without pain or limitations  2. Patient will tolerate full AROM LR at cervical spine without pain  3. Pt improve Oswestry score to </= 2/50 showing significant change.   4. Pt able to perform ADL's and recreational activities with reduced pain  5. Patient will improve LUE symptoms > 75 % from onset of PT. Progress toward previous goals: All Met        Recommendations: Discharge  PT Signature: Bhavna Munoz PT  [place] License # [id number]    Based upon review of the patient's progress it is my medical opinion that Paul West should discontinue physical therapy treatment at [facility] PH[facility]ER[facility]  [address] [place] [id number] [phone].    Signature: __________________________________  Epley, James, APRN    Manual Therapy:    15     mins  41475;  Therapeutic Exercise:    10     mins  71455;     Neuromuscular Cal:        mins  09114;    Therapeutic Activity:          mins  66962;     Gait Training:           mins  06524;     Ultrasound:          mins  78602;     Electrical Stimulation:         mins  74313 ( );  Dry Needling          mins self-pay    Timed Treatment:   25   mins   Total Treatment:     25   mins

## 2020-12-28 ENCOUNTER — TELEPHONE (OUTPATIENT)
Dept: NEUROSURGERY | Facility: CLINIC | Age: 35
End: 2020-12-28

## 2021-01-11 ENCOUNTER — TELEPHONE (OUTPATIENT)
Dept: NEUROSURGERY | Facility: CLINIC | Age: 36
End: 2021-01-11

## 2021-04-16 ENCOUNTER — BULK ORDERING (OUTPATIENT)
Dept: CASE MANAGEMENT | Facility: OTHER | Age: 36
End: 2021-04-16

## 2021-04-16 DIAGNOSIS — Z23 IMMUNIZATION DUE: ICD-10-CM

## 2021-11-23 ENCOUNTER — TELEPHONE (OUTPATIENT)
Dept: FAMILY MEDICINE CLINIC | Facility: CLINIC | Age: 36
End: 2021-11-23

## 2021-11-23 RX ORDER — VALACYCLOVIR HYDROCHLORIDE 500 MG/1
500 TABLET, FILM COATED ORAL 2 TIMES DAILY
Qty: 30 TABLET | Refills: 2 | Status: SHIPPED | OUTPATIENT
Start: 2021-11-23

## 2021-11-23 NOTE — TELEPHONE ENCOUNTER
Caller: Paul West    Relationship: Self    Best call back number: [phone]    Requested Prescriptions:   Requested Prescriptions     Pending Prescriptions Disp Refills   • valACYclovir (VALTREX) 500 MG tablet 16 tablet 2        Pharmacy where request should be sent: [facility] [address] [address] [facility] [phone] [facility] [phone] [facility]     Additional details provided by patient: PATIENT IS NEEDING FOR COLD SORES NOW    Does the patient have less than a 3 day supply:  [x] Yes  [] No    Ofe Bashir Rep   11/23/21 11:01 EST

## 2021-11-23 NOTE — TELEPHONE ENCOUNTER
Rx Refill Note  Requested Prescriptions     Pending Prescriptions Disp Refills   • valACYclovir (VALTREX) 500 MG tablet 16 tablet 2      Last office visit with prescribing clinician: 10/9/2020      Next office visit with prescribing clinician: 1/6/2022            Ofe Valencia Rep  11/23/21, 11:26 EST

## 2022-01-06 ENCOUNTER — OFFICE VISIT (OUTPATIENT)
Dept: FAMILY MEDICINE CLINIC | Facility: CLINIC | Age: 37
End: 2022-01-06

## 2022-01-06 VITALS
DIASTOLIC BLOOD PRESSURE: 80 MMHG | WEIGHT: 202 LBS | RESPIRATION RATE: 12 BRPM | SYSTOLIC BLOOD PRESSURE: 124 MMHG | OXYGEN SATURATION: 96 % | BODY MASS INDEX: 25.12 KG/M2 | HEIGHT: 75 IN | TEMPERATURE: 97.7 F | HEART RATE: 84 BPM

## 2022-01-06 DIAGNOSIS — E55.9 VITAMIN D DEFICIENCY: ICD-10-CM

## 2022-01-06 DIAGNOSIS — Z00.00 HEALTH MAINTENANCE EXAMINATION: ICD-10-CM

## 2022-01-06 DIAGNOSIS — R07.89 ATYPICAL CHEST PAIN: Primary | ICD-10-CM

## 2022-01-06 PROCEDURE — 99395 PREV VISIT EST AGE 18-39: CPT | Performed by: NURSE PRACTITIONER

## 2022-01-06 PROCEDURE — 93000 ELECTROCARDIOGRAM COMPLETE: CPT | Performed by: NURSE PRACTITIONER

## 2022-01-06 NOTE — PATIENT INSTRUCTIONS
Discharge instructions  Continue healthy diet regular exercise lots of vegetables  Continue good ergonomics  Chronic exercises strengthening of your back and stretching your ligaments for good health.    Return office fasting lab    Should you get Covid  Reach out to me immediately for guidance as well as push fluids  Treat fever early and consistently with anti-inflammatory such as ibuprofen 800  3 times a day for several days  If high fever chest pain increased shortness of breath lethargy immediately to the emergency room    Otherwise follow-up for annual physical and lab.

## 2022-01-06 NOTE — PROGRESS NOTES
Procedure   Procedures     Adult ECG Report     Name: Paul West   Age: 36 y.o.   Gender: male       Rate: 63   Rhythm: normal sinus rhythm   QRS Axis: nml   OR Interval: 152   QRS Duration: 90   QTc: 391   Voltages: .   Conduction Disturbances: first-degree A-V block (prolonged P-R interval)   Other Abnormalities: none     Narrative Interpretation: Normal EKG, indication complete physical exam and some atypical chest pain not suggestive of any cardiac origin, no prior EKG available for comparison

## 2022-01-06 NOTE — PROGRESS NOTES
"Chief Complaint  No chief complaint on file.   physical    Subjective          Paul West presents to [facility]  Pleasant gentleman here today doing well here for physical, he has some chronic low back pain occasional exacerbation based on quite nicely here and stable and watches his ergonomics    Occasionally he has noticed a focal sharp pain left upper medial chest without radiation not associated with any exertion walking running.  Never with any nausea vomiting diaphoresis and only for a few seconds suggesting more of a musculoskeletal etiology.  He has no history of heart problems just want to mention this.  He has no history of malignancy other concerns no chronic chest pain  Night sweats or unexplained weight loss.  He is fully vaccinated for Covid      Objective   Vital Signs:   /80   Pulse 84   Temp 97.7 °F (36.5 °C) (Infrared)   Resp 12   Ht 190.5 cm (75\")   Wt 91.6 kg (202 lb)   SpO2 96%   BMI 25.25 kg/m²     Physical Exam  Vitals reviewed.   Constitutional:       Appearance: He is well-developed.   HENT:      Head: Normocephalic.      Nose: Nose normal.   Eyes:      General: No scleral icterus.     Conjunctiva/sclera: Conjunctivae normal.      Pupils: Pupils are equal, round, and reactive to light.   Neck:      Thyroid: No thyromegaly.      Vascular: No JVD.   Cardiovascular:      Rate and Rhythm: Normal rate and regular rhythm.      Heart sounds: Normal heart sounds. No murmur heard.  No friction rub. No gallop.    Pulmonary:      Effort: Pulmonary effort is normal. No respiratory distress.      Breath sounds: Normal breath sounds. No stridor. No wheezing or rales.   Abdominal:      General: Bowel sounds are normal. There is no distension.      Palpations: Abdomen is soft.      Tenderness: There is no abdominal tenderness.      Comments: No hepatosplenomegaly, no ascites,   Genitourinary:     Comments: Testicular exam normal no nodules no masses no " inguinal hernia standing Valsalva  Musculoskeletal:         General: No tenderness.      Cervical back: Neck supple.   Lymphadenopathy:      Cervical: No cervical adenopathy.   Skin:     General: Skin is warm and dry.      Findings: No erythema or rash.   Neurological:      Mental Status: He is alert and oriented to person, place, and time.      Deep Tendon Reflexes: Reflexes are normal and symmetric.   Psychiatric:         Behavior: Behavior normal.         Thought Content: Thought content normal.         Judgment: Judgment normal.        Result Review :                 Assessment and Plan    Diagnoses and all orders for this visit:    1. Atypical chest pain (Primary)  -     ECG 12 Lead  -     Lipid Panel With LDL / HDL Ratio; Future  -     Comprehensive Metabolic Panel; Future  -     CBC & Differential; Future  -     TSH Rfx On Abnormal To Free T4; Future  -     Urinalysis With Microscopic If Indicated (No Culture) - Urine, Clean Catch; Future  -     Vitamin D 25 Hydroxy; Future    2. Vitamin D deficiency  -     Lipid Panel With LDL / HDL Ratio; Future  -     Comprehensive Metabolic Panel; Future  -     CBC & Differential; Future  -     TSH Rfx On Abnormal To Free T4; Future  -     Urinalysis With Microscopic If Indicated (No Culture) - Urine, Clean Catch; Future  -     Vitamin D 25 Hydroxy; Future    3. Health maintenance examination  -     ECG 12 Lead  -     Lipid Panel With LDL / HDL Ratio; Future  -     Comprehensive Metabolic Panel; Future  -     CBC & Differential; Future  -     TSH Rfx On Abnormal To Free T4; Future  -     Urinalysis With Microscopic If Indicated (No Culture) - Urine, Clean Catch; Future  -     Vitamin D 25 Hydroxy; Future        Follow Up   Return Fasting lab soon, next years physical schedule please with fasting lab prior, for Annual physical.  Patient was given instructions and counseling regarding his condition or for health maintenance advice. Please see specific information pulled  into the AVS if appropriate.   Patient is nothing to suggest any heart difficulties no chronic pain just occasional fleeting focal discomfort nonetheless offered a further evaluation including stress test and radical studies patient declines and I think this is reasonable but should he have any increasing frequency pain or other associated symptoms of any chest complaints he should go to the emergency room as well as urgently recheck here for further evaluation    Discharge instructions  Continue healthy diet regular exercise lots of vegetables  Continue good ergonomics  Chronic exercises strengthening of your back and stretching your ligaments for good health.    Return office fasting lab    Should you get Covid  Reach out to me immediately for guidance as well as push fluids  Treat fever early and consistently with anti-inflammatory such as ibuprofen 800  3 times a day for several days  If high fever chest pain increased shortness of breath lethargy immediately to the emergency room    Otherwise follow-up for annual physical and lab.

## 2023-05-15 ENCOUNTER — LAB (OUTPATIENT)
Dept: LAB | Facility: HOSPITAL | Age: 38
End: 2023-05-15
Payer: COMMERCIAL

## 2023-05-15 ENCOUNTER — OFFICE VISIT (OUTPATIENT)
Dept: FAMILY MEDICINE CLINIC | Facility: CLINIC | Age: 38
End: 2023-05-15
Payer: COMMERCIAL

## 2023-05-15 VITALS
OXYGEN SATURATION: 98 % | BODY MASS INDEX: 23.67 KG/M2 | SYSTOLIC BLOOD PRESSURE: 120 MMHG | TEMPERATURE: 97.8 F | WEIGHT: 190.4 LBS | RESPIRATION RATE: 14 BRPM | HEART RATE: 70 BPM | DIASTOLIC BLOOD PRESSURE: 68 MMHG | HEIGHT: 75 IN

## 2023-05-15 DIAGNOSIS — M54.10 RADICULOPATHY, UNSPECIFIED SPINAL REGION: ICD-10-CM

## 2023-05-15 DIAGNOSIS — Z00.00 HEALTH MAINTENANCE EXAMINATION: Primary | ICD-10-CM

## 2023-05-15 DIAGNOSIS — Z00.00 HEALTH MAINTENANCE EXAMINATION: ICD-10-CM

## 2023-05-15 LAB
ALBUMIN SERPL-MCNC: 4.4 G/DL (ref 3.5–5.2)
ALBUMIN/GLOB SERPL: 1.3 G/DL
ALP SERPL-CCNC: 94 U/L (ref 39–117)
ALT SERPL W P-5'-P-CCNC: 49 U/L (ref 1–41)
ANION GAP SERPL CALCULATED.3IONS-SCNC: 10.7 MMOL/L (ref 5–15)
AST SERPL-CCNC: 39 U/L (ref 1–40)
BASOPHILS # BLD AUTO: 0.06 10*3/MM3 (ref 0–0.2)
BASOPHILS NFR BLD AUTO: 1 % (ref 0–1.5)
BILIRUB SERPL-MCNC: 0.3 MG/DL (ref 0–1.2)
BILIRUB UR QL STRIP: NEGATIVE
BUN SERPL-MCNC: 11 MG/DL (ref 6–20)
BUN/CREAT SERPL: 11.1 (ref 7–25)
CALCIUM SPEC-SCNC: 9.7 MG/DL (ref 8.6–10.5)
CHLORIDE SERPL-SCNC: 101 MMOL/L (ref 98–107)
CHOLEST SERPL-MCNC: 174 MG/DL (ref 0–200)
CLARITY UR: CLEAR
CO2 SERPL-SCNC: 27.3 MMOL/L (ref 22–29)
COLOR UR: YELLOW
CREAT SERPL-MCNC: 0.99 MG/DL (ref 0.76–1.27)
DEPRECATED RDW RBC AUTO: 39.2 FL (ref 37–54)
EGFRCR SERPLBLD CKD-EPI 2021: 100.6 ML/MIN/1.73
EOSINOPHIL # BLD AUTO: 0.05 10*3/MM3 (ref 0–0.4)
EOSINOPHIL NFR BLD AUTO: 0.8 % (ref 0.3–6.2)
ERYTHROCYTE [DISTWIDTH] IN BLOOD BY AUTOMATED COUNT: 13.1 % (ref 12.3–15.4)
GLOBULIN UR ELPH-MCNC: 3.3 GM/DL
GLUCOSE SERPL-MCNC: 102 MG/DL (ref 65–99)
GLUCOSE UR STRIP-MCNC: NEGATIVE MG/DL
HCT VFR BLD AUTO: 41.8 % (ref 37.5–51)
HDLC SERPL-MCNC: 29 MG/DL (ref 40–60)
HGB BLD-MCNC: 13.9 G/DL (ref 13–17.7)
HGB UR QL STRIP.AUTO: NEGATIVE
IMM GRANULOCYTES # BLD AUTO: 0.03 10*3/MM3 (ref 0–0.05)
IMM GRANULOCYTES NFR BLD AUTO: 0.5 % (ref 0–0.5)
KETONES UR QL STRIP: NEGATIVE
LDLC SERPL CALC-MCNC: 112 MG/DL (ref 0–100)
LDLC/HDLC SERPL: 3.72 {RATIO}
LEUKOCYTE ESTERASE UR QL STRIP.AUTO: NEGATIVE
LYMPHOCYTES # BLD AUTO: 1.9 10*3/MM3 (ref 0.7–3.1)
LYMPHOCYTES NFR BLD AUTO: 30.8 % (ref 19.6–45.3)
MCH RBC QN AUTO: 27.3 PG (ref 26.6–33)
MCHC RBC AUTO-ENTMCNC: 33.3 G/DL (ref 31.5–35.7)
MCV RBC AUTO: 82.1 FL (ref 79–97)
MONOCYTES # BLD AUTO: 0.66 10*3/MM3 (ref 0.1–0.9)
MONOCYTES NFR BLD AUTO: 10.7 % (ref 5–12)
NEUTROPHILS NFR BLD AUTO: 3.46 10*3/MM3 (ref 1.7–7)
NEUTROPHILS NFR BLD AUTO: 56.2 % (ref 42.7–76)
NITRITE UR QL STRIP: NEGATIVE
NRBC BLD AUTO-RTO: 0 /100 WBC (ref 0–0.2)
PH UR STRIP.AUTO: 7 [PH] (ref 5–8)
PLATELET # BLD AUTO: 213 10*3/MM3 (ref 140–450)
PMV BLD AUTO: 10.5 FL (ref 6–12)
POTASSIUM SERPL-SCNC: 4.1 MMOL/L (ref 3.5–5.2)
PROT SERPL-MCNC: 7.7 G/DL (ref 6–8.5)
PROT UR QL STRIP: NEGATIVE
RBC # BLD AUTO: 5.09 10*6/MM3 (ref 4.14–5.8)
SODIUM SERPL-SCNC: 139 MMOL/L (ref 136–145)
SP GR UR STRIP: 1.01 (ref 1–1.03)
TRIGL SERPL-MCNC: 185 MG/DL (ref 0–150)
TSH SERPL DL<=0.05 MIU/L-ACNC: 1.87 UIU/ML (ref 0.27–4.2)
UROBILINOGEN UR QL STRIP: NORMAL
VLDLC SERPL-MCNC: 33 MG/DL (ref 5–40)
WBC NRBC COR # BLD: 6.16 10*3/MM3 (ref 3.4–10.8)

## 2023-05-15 PROCEDURE — 84443 ASSAY THYROID STIM HORMONE: CPT

## 2023-05-15 PROCEDURE — 80053 COMPREHEN METABOLIC PANEL: CPT

## 2023-05-15 PROCEDURE — 80061 LIPID PANEL: CPT

## 2023-05-15 PROCEDURE — 99395 PREV VISIT EST AGE 18-39: CPT | Performed by: NURSE PRACTITIONER

## 2023-05-15 PROCEDURE — 36415 COLL VENOUS BLD VENIPUNCTURE: CPT

## 2023-05-15 PROCEDURE — 85025 COMPLETE CBC W/AUTO DIFF WBC: CPT

## 2023-05-15 PROCEDURE — 81003 URINALYSIS AUTO W/O SCOPE: CPT

## 2023-05-15 NOTE — PROGRESS NOTES
"Chief Complaint  Annual Exam    Subjective        Paul West presents to [facility]  History of Present Illness  Pleasant patient here today for complete physical exam overall he is doing well no chest pain shortness of breath or other issues although he has had some cervical radiculopathy a few years ago get MRI as well, its been much much better but now more recently has been having some pain radiate in the same distribution but no weakness down his trapezoid  MRI in 2020 showed some degenerative changes and some canal stenosis mild to moderate.  He has no weakness no exertional chest pain or other complaints,    Social history no change,        Objective   Vital Signs:  /68   Pulse 70   Temp 97.8 °F (36.6 °C) (Temporal)   Resp 14   Ht 190.5 cm (75\")   Wt 86.4 kg (190 lb 6.4 oz)   SpO2 98%   BMI 23.80 kg/m²   Estimated body mass index is 23.8 kg/m² as calculated from the following:    Height as of this encounter: 190.5 cm (75\").    Weight as of this encounter: 86.4 kg (190 lb 6.4 oz).    BMI is within normal parameters. No other follow-up for BMI required.      Physical Exam  Vitals reviewed.   Constitutional:       General: He is not in acute distress.     Appearance: Normal appearance. He is well-developed and normal weight. He is not toxic-appearing or diaphoretic.      Comments: Ariella appears well   HENT:      Head: Normocephalic.      Right Ear: Tympanic membrane normal.      Left Ear: Tympanic membrane normal.      Nose: Nose normal.      Mouth/Throat:      Pharynx: No oropharyngeal exudate or posterior oropharyngeal erythema.   Eyes:      General: No scleral icterus.        Left eye: No discharge.      Conjunctiva/sclera: Conjunctivae normal.      Pupils: Pupils are equal, round, and reactive to light.   Neck:      Thyroid: No thyromegaly.      Vascular: No JVD.   Cardiovascular:      Rate and Rhythm: Normal rate and regular rhythm.      Heart sounds: " Normal heart sounds. No murmur heard.    No friction rub. No gallop.   Pulmonary:      Effort: Pulmonary effort is normal. No respiratory distress.      Breath sounds: Normal breath sounds. No stridor. No wheezing or rales.   Abdominal:      General: Bowel sounds are normal. There is no distension.      Palpations: Abdomen is soft. There is no mass.      Tenderness: There is no abdominal tenderness. There is no right CVA tenderness, left CVA tenderness, guarding or rebound.      Hernia: No hernia is present.      Comments: No hepatosplenomegaly, no ascites,   Genitourinary:     Comments: No inguinal hernia, although he does have standing with Valsalva no bulging without Valsalva left groin with Valsalva and modest small bulge however does not maintain any bulging no definite hernia  Musculoskeletal:         General: No tenderness. Normal range of motion.      Cervical back: Neck supple. No tenderness.      Comments: Normal range of motion except he is limited mild to moderately left lateral due to initiating pain down his left scapula and trap and upper arm.  No focal weakness   Lymphadenopathy:      Cervical: No cervical adenopathy.   Skin:     General: Skin is warm and dry.      Coloration: Skin is not jaundiced or pale.      Findings: No bruising, erythema, lesion or rash.   Neurological:      General: No focal deficit present.      Mental Status: He is alert and oriented to person, place, and time. Mental status is at baseline.      Deep Tendon Reflexes: Reflexes are normal and symmetric.   Psychiatric:         Mood and Affect: Mood normal.         Behavior: Behavior normal.         Thought Content: Thought content normal.         Judgment: Judgment normal.        Result Review :                Assessment and Plan   Diagnoses and all orders for this visit:    1. Health maintenance examination (Primary)             Follow Up   No follow-ups on file.  Patient was given instructions and counseling regarding his  condition or for health maintenance advice. Please see specific information pulled into the AVS if appropriate.     There are no Patient Instructions on file for this visit.

## 2023-05-15 NOTE — PATIENT INSTRUCTIONS
Discharge instructions you are in very good health, no worries regarding any hernia I see no evidence of a hernia today  However good ergonomics and good form to her event is always smart and good chronically and good muscle tone in your abdomen and your back  May decrease risk of developing hernias or other worries.    Lots of fiber fiber fiber fiber and minimize fast food as able.    Exercises of your neck physical therapy, if not improved, will do something different I will refer you but otherwise you will likely need several months for improvement should you have weakness bowel or bladder incontinence retention severe uncontrolled pain emergency room

## 2023-05-16 ENCOUNTER — TELEPHONE (OUTPATIENT)
Dept: FAMILY MEDICINE CLINIC | Facility: CLINIC | Age: 38
End: 2023-05-16
Payer: COMMERCIAL

## 2023-05-16 DIAGNOSIS — R79.89 ELEVATED LIVER FUNCTION TESTS: Primary | ICD-10-CM

## 2023-07-27 ENCOUNTER — HOSPITAL ENCOUNTER (OUTPATIENT)
Dept: ULTRASOUND IMAGING | Facility: HOSPITAL | Age: 38
Discharge: HOME | End: 2023-07-27
Admitting: NURSE PRACTITIONER
Payer: COMMERCIAL

## 2023-07-27 DIAGNOSIS — R79.89 ELEVATED LIVER FUNCTION TESTS: ICD-10-CM

## 2023-07-27 PROCEDURE — 76705 ECHO EXAM OF ABDOMEN: CPT

## 2023-10-16 ENCOUNTER — OFFICE VISIT (OUTPATIENT)
Dept: GASTROENTEROLOGY | Facility: CLINIC | Age: 38
End: 2023-10-16
Payer: COMMERCIAL

## 2023-10-16 VITALS
DIASTOLIC BLOOD PRESSURE: 66 MMHG | WEIGHT: 182 LBS | SYSTOLIC BLOOD PRESSURE: 110 MMHG | HEIGHT: 74 IN | TEMPERATURE: 96.8 F | HEART RATE: 82 BPM | BODY MASS INDEX: 23.36 KG/M2 | OXYGEN SATURATION: 96 %

## 2023-10-16 DIAGNOSIS — R79.89 ELEVATED LIVER FUNCTION TESTS: ICD-10-CM

## 2023-10-16 PROCEDURE — 99213 OFFICE O/P EST LOW 20 MIN: CPT | Performed by: NURSE PRACTITIONER

## 2023-10-16 NOTE — PROGRESS NOTES
Chief Complaint   Patient presents with    elevated LFT       HPI    Paul West is a  37 y.o. male here as a new over 3-year patient for elevated liver function test.    Patient was last seen and evaluated by Dr. Amaya in 2017.    Reviewed labs 7/13/2023 - AST 62, , ALP 76 with normal bilirubin.    Liver ultrasound in July (reviewed) with mild hepatomegaly and biliary sludge without ductal dilation.    On visit today patient reports feeling quite well.  Denies right upper quadrant pain, nausea or vomiting.  He has been eating healthier and has not required PPI therapy in quite some time.  He denies routine NSAID use.  He rarely drinks alcohol.  No family history of liver disease that he is aware of.  He is never been exposed to viral hepatitis nor does he have a history of IV drug use.    Additional data reviewed:    EGD 2017 - gastritis otherwise normal.  Pathology was benign negative for H. pylori as well.    Past Medical History:   Diagnosis Date    GERD (gastroesophageal reflux disease)     Indigestion        Past Surgical History:   Procedure Laterality Date    ENDOSCOPY N/A 8/31/2017    normal esophagus , gastritis, path mild imflammation.    FINGER SURGERY Right 2005    middle finger    WISDOM TOOTH EXTRACTION  12/2004       Scheduled Meds:     Continuous Infusions: No current facility-administered medications for this visit.      PRN Meds:     No Known Allergies    Social History     Socioeconomic History    Marital status: Single    Number of children: 0    Years of education: College   Tobacco Use    Smoking status: Never    Smokeless tobacco: Former    Tobacco comments:     Smoke the vape    Substance and Sexual Activity    Alcohol use: Not Currently    Drug use: Yes     Types: Marijuana    Sexual activity: Defer       Family History   Problem Relation Age of Onset    Prostate cancer Father     Colon cancer Paternal Grandfather     Malig Hyperthermia Neg Hx        Review of Systems   HENT:   Negative for trouble swallowing.    Gastrointestinal: Negative.        Vitals:    10/16/23 1352   BP: 110/66   Pulse: 82   Temp: 96.8 °F (36 °C)   SpO2: 96%       Physical Exam  Constitutional:       Appearance: He is well-developed.   Abdominal:      General: Bowel sounds are normal. There is no distension.      Palpations: Abdomen is soft. There is no mass.      Tenderness: There is no abdominal tenderness. There is no guarding.      Hernia: No hernia is present.   Skin:     General: Skin is warm and dry.      Capillary Refill: Capillary refill takes less than 2 seconds.   Neurological:      Mental Status: He is alert and oriented to person, place, and time.   Psychiatric:         Behavior: Behavior normal.     Assessment    Diagnoses and all orders for this visit:    1. Elevated liver function tests  -     Ambulatory Referral to Gastroenterology  -     CBC (No Diff)  -     Comprehensive Metabolic Panel  -     Protime-INR       Plan    Very pleasant 37-year-old male seen today for elevated liver function tests asymptomatic from a GI standpoint and denies risk factors for liver disease as such would recommend reassessment of liver function with the addition of CBC and PT/INR.  At that point we can make further recommendations whether or not comprehensive serologic work-up is warranted.         VIRIDIANA Blandon  [facility]  [address] [place] [address]  Office: [phone]

## 2023-10-17 ENCOUNTER — TELEPHONE (OUTPATIENT)
Dept: GASTROENTEROLOGY | Facility: CLINIC | Age: 38
End: 2023-10-17
Payer: COMMERCIAL

## 2023-10-17 DIAGNOSIS — R79.89 ELEVATED LIVER FUNCTION TESTS: Primary | ICD-10-CM

## 2023-10-17 LAB
ALBUMIN SERPL-MCNC: 4.8 G/DL (ref 4.1–5.1)
ALBUMIN/GLOB SERPL: 2 {RATIO} (ref 1.2–2.2)
ALP SERPL-CCNC: 76 IU/L (ref 44–121)
ALT SERPL-CCNC: 59 IU/L (ref 0–44)
AST SERPL-CCNC: 37 IU/L (ref 0–40)
BILIRUB SERPL-MCNC: 0.3 MG/DL (ref 0–1.2)
BUN SERPL-MCNC: 12 MG/DL (ref 6–20)
BUN/CREAT SERPL: 12 (ref 9–20)
CALCIUM SERPL-MCNC: 9.8 MG/DL (ref 8.7–10.2)
CHLORIDE SERPL-SCNC: 105 MMOL/L (ref 96–106)
CO2 SERPL-SCNC: 26 MMOL/L (ref 20–29)
CREAT SERPL-MCNC: 1.02 MG/DL (ref 0.76–1.27)
EGFRCR SERPLBLD CKD-EPI 2021: 97 ML/MIN/1.73
ERYTHROCYTE [DISTWIDTH] IN BLOOD BY AUTOMATED COUNT: 13.6 % (ref 11.6–15.4)
GLOBULIN SER CALC-MCNC: 2.4 G/DL (ref 1.5–4.5)
GLUCOSE SERPL-MCNC: 89 MG/DL (ref 70–99)
HCT VFR BLD AUTO: 43.1 % (ref 37.5–51)
HGB BLD-MCNC: 14.1 G/DL (ref 13–17.7)
INR PPP: 1 (ref 0.9–1.2)
MCH RBC QN AUTO: 27.3 PG (ref 26.6–33)
MCHC RBC AUTO-ENTMCNC: 32.7 G/DL (ref 31.5–35.7)
MCV RBC AUTO: 83 FL (ref 79–97)
PLATELET # BLD AUTO: 218 X10E3/UL (ref 150–450)
POTASSIUM SERPL-SCNC: 4.2 MMOL/L (ref 3.5–5.2)
PROT SERPL-MCNC: 7.2 G/DL (ref 6–8.5)
PROTHROMBIN TIME: 10.7 SEC (ref 9.1–12)
RBC # BLD AUTO: 5.17 X10E6/UL (ref 4.14–5.8)
SODIUM SERPL-SCNC: 143 MMOL/L (ref 134–144)
WBC # BLD AUTO: 4.7 X10E3/UL (ref 3.4–10.8)

## 2023-10-17 NOTE — TELEPHONE ENCOUNTER
Called pt and advised of Aicha HEDRICK's note.  Pt verbalized understanding.    Order placed for CMP, sent to FLORIDALMA Holcomb to cosign.

## 2023-10-17 NOTE — TELEPHONE ENCOUNTER
----- Message from VIRIDIANA Blandon sent at 10/17/2023 10:24 AM EDT -----  Please inform the patient liver function test have improved.  No evidence of anemia.  Would recommend avoidance of alcohol, NSAIDs and adherence to a low-fat diet and repeat CMP in 6 weeks while fasting.

## 2023-10-17 NOTE — PROGRESS NOTES
Please inform the patient liver function test have improved.  No evidence of anemia.  Would recommend avoidance of alcohol, NSAIDs and adherence to a low-fat diet and repeat CMP in 6 weeks while fasting.

## 2024-07-31 ENCOUNTER — TELEPHONE (OUTPATIENT)
Dept: FAMILY MEDICINE CLINIC | Facility: CLINIC | Age: 39
End: 2024-07-31
Payer: COMMERCIAL

## 2024-07-31 DIAGNOSIS — Z00.00 ANNUAL PHYSICAL EXAM: Primary | ICD-10-CM

## 2024-07-31 NOTE — TELEPHONE ENCOUNTER
Caller: Paul West    Relationship: Self    Best call back number: [phone]     What orders are you requesting (i.e. lab or imaging): BLOOD WORK FOR PHYSICAL     In what timeframe would the patient need to come in: ASAP    Where will you receive your lab/imaging services: OFFICE    Additional notes: PLEASE CALL AND SCHEDULE LAB APPOINTMENT ONCE ORDERS ARE PLACED

## 2024-08-20 ENCOUNTER — LAB (OUTPATIENT)
Dept: LAB | Facility: HOSPITAL | Age: 39
End: 2024-08-20
Payer: COMMERCIAL

## 2024-08-20 PROCEDURE — 80050 GENERAL HEALTH PANEL: CPT | Performed by: NURSE PRACTITIONER

## 2024-08-20 PROCEDURE — 80061 LIPID PANEL: CPT | Performed by: NURSE PRACTITIONER

## 2024-08-21 ENCOUNTER — OFFICE VISIT (OUTPATIENT)
Dept: FAMILY MEDICINE CLINIC | Facility: CLINIC | Age: 39
End: 2024-08-21
Payer: COMMERCIAL

## 2024-08-21 VITALS
RESPIRATION RATE: 16 BRPM | BODY MASS INDEX: 23.55 KG/M2 | HEIGHT: 74 IN | TEMPERATURE: 99.1 F | HEART RATE: 65 BPM | SYSTOLIC BLOOD PRESSURE: 112 MMHG | WEIGHT: 183.5 LBS | OXYGEN SATURATION: 99 % | DIASTOLIC BLOOD PRESSURE: 68 MMHG

## 2024-08-21 DIAGNOSIS — R73.09 ELEVATED GLUCOSE: ICD-10-CM

## 2024-08-21 DIAGNOSIS — Z00.00 HEALTH MAINTENANCE EXAMINATION: Primary | ICD-10-CM

## 2024-08-21 PROCEDURE — 99395 PREV VISIT EST AGE 18-39: CPT | Performed by: NURSE PRACTITIONER

## 2024-08-21 NOTE — PATIENT INSTRUCTIONS
Discharge discharge    Focus on decreasing carbohydrates breads and pasta your body wants to store these as fat, it can increase risk of fatty liver disease  You have a genetic issue likely in some dietary reasons, elevated triglycerides, and low HDL and for this reason, focus on lowering processed foods which the body processes too quickly causing fat storage and elevated glucose and this is a risk factor for diabetes, your sugar was a little high today for fasting it may have been influenced by last night treat     No worries you are healthy and these conversations are to keep you healthy over the next 10 to 30 years or so    Lets see where your A1c is a very well may be normal, no worries,  Unlikely you have diabetes     if your A1c is normal, likely I will recommend Vascepa or a fibrate fenofibrate for your triglycerides  Will await your labs, as long as you are doing well annual physical,  Annual labs, depending on your labs will probably recheck outpatient 6 months or so or sooner depending on what can help you leave here feeling healthy

## 2024-08-21 NOTE — PROGRESS NOTES
"Chief Complaint  Annual Exam    Subjective        Paul West presents to [facility]  History of Present Illness  Pleasant patient here today for health maintenance he is doing well feels good no chest pain shortness of breath or other complaints  Social history no change family history no change fasting glucose mildly elevated      Objective   Vital Signs:  /68 (BP Location: Right arm, Patient Position: Sitting, Cuff Size: Adult)   Pulse 65   Temp 99.1 °F (37.3 °C) (Oral)   Resp 16   Ht 188 cm (74\")   Wt 83.2 kg (183 lb 8 oz)   SpO2 99%   BMI 23.56 kg/m²   Estimated body mass index is 23.56 kg/m² as calculated from the following:    Height as of this encounter: 188 cm (74\").    Weight as of this encounter: 83.2 kg (183 lb 8 oz).    BMI is within normal parameters. No other follow-up for BMI required.      Physical Exam  Vitals reviewed.   Constitutional:       General: He is not in acute distress.     Appearance: Normal appearance. He is well-developed. He is not ill-appearing, toxic-appearing or diaphoretic.   HENT:      Head: Normocephalic.      Nose: Nose normal.   Eyes:      General: No scleral icterus.     Conjunctiva/sclera: Conjunctivae normal.      Pupils: Pupils are equal, round, and reactive to light.   Neck:      Thyroid: No thyromegaly.      Vascular: No JVD.   Cardiovascular:      Rate and Rhythm: Normal rate and regular rhythm.      Heart sounds: Normal heart sounds. No murmur heard.     No friction rub. No gallop.   Pulmonary:      Effort: Pulmonary effort is normal. No respiratory distress.      Breath sounds: Normal breath sounds. No stridor. No wheezing or rales.   Abdominal:      General: Bowel sounds are normal. There is no distension.      Palpations: Abdomen is soft.      Tenderness: There is no abdominal tenderness.      Comments: No hepatosplenomegaly, no ascites,   Musculoskeletal:         General: No tenderness.      Cervical back: Neck " supple.   Lymphadenopathy:      Cervical: No cervical adenopathy.   Skin:     General: Skin is warm and dry.      Findings: No erythema or rash.   Neurological:      Mental Status: He is alert and oriented to person, place, and time.      Deep Tendon Reflexes: Reflexes are normal and symmetric.   Psychiatric:         Behavior: Behavior normal.         Thought Content: Thought content normal.         Judgment: Judgment normal.        Result Review :                Assessment and Plan   Diagnoses and all orders for this visit:    1. Health maintenance examination (Primary)  -     Hemoglobin A1c  -     Insulin, Total    2. Elevated glucose  -     Hemoglobin A1c  -     Insulin, Total             Follow Up   Return for Labs today, Labs before next visit, Annual physical.  Patient was given instructions and counseling regarding his condition or for health maintenance advice. Please see specific information pulled into the AVS if appropriate.     Patient Instructions   Discharge discharge    Focus on decreasing carbohydrates breads and pasta your body wants to store these as fat, it can increase risk of fatty liver disease  You have a genetic issue likely in some dietary reasons, elevated triglycerides, and low HDL and for this reason, focus on lowering processed foods which the body processes too quickly causing fat storage and elevated glucose and this is a risk factor for diabetes, your sugar was a little high today for fasting it may have been influenced by last night treat     No worries you are healthy and these conversations are to keep you healthy over the next 10 to 30 years or so    Lets see where your A1c is a very well may be normal, no worries,  Unlikely you have diabetes     if your A1c is normal, likely I will recommend Vascepa or a fibrate fenofibrate for your triglycerides  Will await your labs, as long as you are doing well annual physical,  Annual labs, depending on your labs will probably recheck  outpatient 6 months or so or sooner depending on what can help you leave here feeling healthy

## 2024-10-01 ENCOUNTER — TELEPHONE (OUTPATIENT)
Dept: FAMILY MEDICINE CLINIC | Facility: CLINIC | Age: 39
End: 2024-10-01
Payer: COMMERCIAL

## 2024-10-16 ENCOUNTER — TELEPHONE (OUTPATIENT)
Dept: FAMILY MEDICINE CLINIC | Facility: CLINIC | Age: 39
End: 2024-10-16
Payer: COMMERCIAL

## 2024-10-23 ENCOUNTER — OFFICE VISIT (OUTPATIENT)
Dept: FAMILY MEDICINE CLINIC | Facility: CLINIC | Age: 39
End: 2024-10-23
Payer: COMMERCIAL

## 2024-10-23 ENCOUNTER — LAB (OUTPATIENT)
Dept: LAB | Facility: HOSPITAL | Age: 39
End: 2024-10-23
Payer: COMMERCIAL

## 2024-10-23 VITALS
OXYGEN SATURATION: 99 % | TEMPERATURE: 97.8 F | WEIGHT: 181.3 LBS | BODY MASS INDEX: 23.27 KG/M2 | HEART RATE: 59 BPM | HEIGHT: 74 IN | DIASTOLIC BLOOD PRESSURE: 86 MMHG | RESPIRATION RATE: 12 BRPM | SYSTOLIC BLOOD PRESSURE: 122 MMHG

## 2024-10-23 DIAGNOSIS — J34.89 SINUS PRESSURE: Primary | ICD-10-CM

## 2024-10-23 DIAGNOSIS — J34.89 OBSTRUCTION OF PARANASAL SINUS: ICD-10-CM

## 2024-10-23 DIAGNOSIS — R73.9 HYPERGLYCEMIA: ICD-10-CM

## 2024-10-23 LAB — HBA1C MFR BLD: 5.3 % (ref 4.8–5.6)

## 2024-10-23 PROCEDURE — 99213 OFFICE O/P EST LOW 20 MIN: CPT | Performed by: NURSE PRACTITIONER

## 2024-10-23 PROCEDURE — 83525 ASSAY OF INSULIN: CPT | Performed by: NURSE PRACTITIONER

## 2024-10-23 PROCEDURE — 83036 HEMOGLOBIN GLYCOSYLATED A1C: CPT | Performed by: NURSE PRACTITIONER

## 2024-10-23 PROCEDURE — 82947 ASSAY GLUCOSE BLOOD QUANT: CPT | Performed by: NURSE PRACTITIONER

## 2024-10-23 PROCEDURE — 36415 COLL VENOUS BLD VENIPUNCTURE: CPT | Performed by: NURSE PRACTITIONER

## 2024-10-23 RX ORDER — FLUTICASONE PROPIONATE 50 MCG
2 SPRAY, SUSPENSION (ML) NASAL DAILY
Qty: 48 ML | Refills: 3 | Status: SHIPPED | OUTPATIENT
Start: 2024-10-23

## 2024-10-23 NOTE — PATIENT INSTRUCTIONS
Use Flonase as a trial for at least 3 months, if not effective DC keep your appointment with ENT for an evaluation, let me know if there is anything I can do for you, increasing pain fever chills urgent recheck ER

## 2024-10-23 NOTE — PROGRESS NOTES
"Chief Complaint  Sinus Problem (Right side affected, blockage, soreness when swallowing, drainage.Labs.)    Subjective        Paul West presents to [facility]  History of Present Illness  Pleasant gentleman here today has recurrent right-sided sinus pressure, feels like some drainage and sometimes drainage sensation up to his ear, he has seen ENT several years ago and suggested surgery, he would just simply like a second opinion, he had a good visit last visit but would like reevaluation and see if this was in his best interest, presently not taking sinus medications, he does have a history of allergies, and generally good health otherwise.  No increasing sinus pain fever severe headache presently.  Glucose was a little high with his fasting labs but he is not sure if he was a true fasting so we will repeat this today as well as an A1c    Sinus Problem        Objective   Vital Signs:  /86 (BP Location: Left arm, Patient Position: Sitting, Cuff Size: Adult)   Pulse 59   Temp 97.8 °F (36.6 °C) (Oral)   Resp 12   Ht 188 cm (74.02\")   Wt 82.2 kg (181 lb 4.8 oz)   SpO2 99%   BMI 23.27 kg/m²   Estimated body mass index is 23.27 kg/m² as calculated from the following:    Height as of this encounter: 188 cm (74.02\").    Weight as of this encounter: 82.2 kg (181 lb 4.8 oz).    BMI is within normal parameters. No other follow-up for BMI required.      Physical Exam  Constitutional:       General: He is not in acute distress.     Appearance: Normal appearance. He is not ill-appearing, toxic-appearing or diaphoretic.   HENT:      Right Ear: Tympanic membrane normal.      Left Ear: Tympanic membrane normal.      Ears:      Comments: Turbinates congested but TMs look clear, pharynx is clear tonsils normal no cervical adenopathy neck is supple.  Speech clear  Eyes:      Conjunctiva/sclera: Conjunctivae normal.      Pupils: Pupils are equal, round, and reactive to light. "   Pulmonary:      Effort: Pulmonary effort is normal. No respiratory distress.   Skin:     General: Skin is warm and dry.   Neurological:      General: No focal deficit present.      Mental Status: He is oriented to person, place, and time. Mental status is at baseline.   Psychiatric:         Mood and Affect: Mood normal.         Behavior: Behavior normal.         Thought Content: Thought content normal.         Judgment: Judgment normal.        Result Review :                Assessment and Plan   Diagnoses and all orders for this visit:    1. Sinus pressure (Primary)  -     Ambulatory Referral to ENT (Otolaryngology)    2. Obstruction of paranasal sinus  -     Ambulatory Referral to ENT (Otolaryngology)    3. Hyperglycemia  -     Glucose, Plasma ([facility])  -     Hemoglobin A1c  -     Insulin, Total    Other orders  -     fluticasone (FLONASE) 50 MCG/ACT nasal spray; Administer 2 sprays into the nostril(s) as directed by provider Daily.  Dispense: 48 mL; Refill: 3             Follow Up   No follow-ups on file.  Patient was given instructions and counseling regarding his condition or for health maintenance advice. Please see specific information pulled into the AVS if appropriate.     Patient Instructions   Use Flonase as a trial for at least 3 months, if not effective DC keep your appointment with ENT for an evaluation, let me know if there is anything I can do for you, increasing pain fever chills urgent recheck ER           Answers submitted by the patient for this visit:  Other (Submitted on 10/23/2024)  Please describe your symptoms.: Nasal blockage and related sinus problems. Will not clear for months on months.  Have you had these symptoms before?: Yes  How long have you been having these symptoms?: Greater than 2 weeks  Please list any medications you are currently taking for this condition.: NA  Please describe any probable cause for these symptoms. : Turbinites enlarged, sinus issues on Gautam nasal passage  through to ear canal  Primary Reason for Visit (Submitted on 10/23/2024)  What is the primary reason for your visit?: Problem Not Listed

## 2024-10-24 LAB
GLUCOSE P FAST SERPL-MCNC: 107 MG/DL (ref 70–99)
INSULIN SERPL-ACNC: 4.5 UIU/ML (ref 2.6–24.9)

## 2024-11-25 RX ORDER — VALACYCLOVIR HYDROCHLORIDE 500 MG/1
500 TABLET, FILM COATED ORAL 2 TIMES DAILY
Qty: 30 TABLET | Refills: 2 | Status: SHIPPED | OUTPATIENT
Start: 2024-11-25

## (undated) DEVICE — TUBING, SUCTION, 1/4" X 10', STRAIGHT: Brand: MEDLINE

## (undated) DEVICE — Device: Brand: DEFENDO AIR/WATER/SUCTION AND BIOPSY VALVE

## (undated) DEVICE — BITEBLOCK OMNI BLOC

## (undated) DEVICE — CANN NASL CO2 TRULINK W/O2 A/

## (undated) DEVICE — FRCP BX RADJAW4 NDL 2.8 240CM LG OG BX40

## (undated) DEVICE — TBG 02 CRUSH RESIST LF CLR 7FT